# Patient Record
Sex: MALE | Race: WHITE | NOT HISPANIC OR LATINO | Employment: OTHER | URBAN - METROPOLITAN AREA
[De-identification: names, ages, dates, MRNs, and addresses within clinical notes are randomized per-mention and may not be internally consistent; named-entity substitution may affect disease eponyms.]

---

## 2018-11-29 ENCOUNTER — OFFICE VISIT (OUTPATIENT)
Dept: FAMILY MEDICINE | Facility: CLINIC | Age: 68
End: 2018-11-29
Payer: COMMERCIAL

## 2018-11-29 VITALS
HEIGHT: 65 IN | HEART RATE: 81 BPM | SYSTOLIC BLOOD PRESSURE: 120 MMHG | WEIGHT: 195.88 LBS | DIASTOLIC BLOOD PRESSURE: 80 MMHG | OXYGEN SATURATION: 97 % | TEMPERATURE: 98 F | RESPIRATION RATE: 16 BRPM | BODY MASS INDEX: 32.64 KG/M2

## 2018-11-29 DIAGNOSIS — I10 ESSENTIAL HYPERTENSION: ICD-10-CM

## 2018-11-29 DIAGNOSIS — Z00.00 WELLNESS EXAMINATION: Primary | ICD-10-CM

## 2018-11-29 DIAGNOSIS — E78.5 HYPERLIPIDEMIA, UNSPECIFIED HYPERLIPIDEMIA TYPE: ICD-10-CM

## 2018-11-29 PROCEDURE — 99387 INIT PM E/M NEW PAT 65+ YRS: CPT | Mod: ,,, | Performed by: FAMILY MEDICINE

## 2018-11-29 PROCEDURE — 1101F PT FALLS ASSESS-DOCD LE1/YR: CPT | Mod: ,,, | Performed by: FAMILY MEDICINE

## 2018-11-29 RX ORDER — AMLODIPINE AND BENAZEPRIL HYDROCHLORIDE 5; 10 MG/1; MG/1
1 CAPSULE ORAL DAILY
Refills: 3 | COMMUNITY
Start: 2018-10-15 | End: 2019-06-18 | Stop reason: SDUPTHER

## 2018-11-29 RX ORDER — PRAVASTATIN SODIUM 80 MG/1
80 TABLET ORAL DAILY
Refills: 3 | COMMUNITY
Start: 2018-10-15 | End: 2019-06-18 | Stop reason: SDUPTHER

## 2018-11-29 RX ORDER — NAPROXEN 500 MG/1
500 TABLET ORAL 2 TIMES DAILY
Refills: 1 | COMMUNITY
Start: 2018-11-23 | End: 2018-11-29

## 2018-11-29 NOTE — PROGRESS NOTES
SUBJECTIVE:    Patient ID: Jose Morataya is a 68 y.o. male.    Chief Complaint: Annual Exam    HPI  68-year-old male new to this provider no past medical records to review patient currently being treated for hypertension hyperlipidemia blood pressure today's well-controlled. Last lipid panel unknown note is also on file. Patient is doing well today he has no complaints he does not need medication refills    Past Medical History:   Diagnosis Date    Hyperlipidemia     Hypertension      Social History     Socioeconomic History    Marital status:      Spouse name: Not on file    Number of children: Not on file    Years of education: Not on file    Highest education level: Not on file   Social Needs    Financial resource strain: Not on file    Food insecurity - worry: Not on file    Food insecurity - inability: Not on file    Transportation needs - medical: Not on file    Transportation needs - non-medical: Not on file   Occupational History    Not on file   Tobacco Use    Smoking status: Never Smoker    Smokeless tobacco: Never Used   Substance and Sexual Activity    Alcohol use: No     Frequency: Never    Drug use: No    Sexual activity: Not on file   Other Topics Concern    Not on file   Social History Narrative    Not on file     History reviewed. No pertinent surgical history.  Family History   Problem Relation Age of Onset    Cancer Mother     No Known Problems Father      Current Outpatient Medications   Medication Sig Dispense Refill    amlodipine-benazepril 5-10 mg (LOTREL) 5-10 mg per capsule Take 1 capsule by mouth once daily.  3    pravastatin (PRAVACHOL) 80 MG tablet Take 80 mg by mouth once daily.  3     No current facility-administered medications for this visit.      Review of patient's allergies indicates:  No Known Allergies    Review of Systems   Constitutional: Negative for activity change, appetite change, fatigue and fever.   HENT: Negative for congestion, ear pain,  "hearing loss, postnasal drip, sinus pressure, sinus pain, sneezing and sore throat.    Eyes: Negative for photophobia and pain.   Respiratory: Negative for cough, chest tightness, shortness of breath and wheezing.    Cardiovascular: Negative for chest pain and leg swelling.   Gastrointestinal: Negative for abdominal distention, abdominal pain, blood in stool, constipation, diarrhea, nausea and vomiting.   Endocrine: Negative for cold intolerance, heat intolerance, polydipsia and polyuria.   Genitourinary: Negative for difficulty urinating, dysuria, flank pain, frequency, hematuria and urgency.        Patient waking up 3 times a night to urinate, denies difficulty starting stream, no breaks in  His stream, no dysuria no hematuria.   Musculoskeletal: Negative for arthralgias, back pain, joint swelling, myalgias and neck pain.   Skin: Negative for pallor and rash.   Allergic/Immunologic: Negative for environmental allergies and food allergies.   Neurological: Negative for dizziness, weakness, light-headedness and headaches.   Hematological: Does not bruise/bleed easily.   Psychiatric/Behavioral: Negative for confusion, decreased concentration and sleep disturbance. The patient is not nervous/anxious.           Blood pressure 120/80, pulse 81, temperature 98.3 °F (36.8 °C), temperature source Oral, resp. rate 16, height 5' 5" (1.651 m), weight 88.9 kg (195 lb 14.4 oz), SpO2 97 %. Body mass index is 32.6 kg/m².   Objective:      Physical Exam   Constitutional: He appears well-developed and well-nourished. No distress.   HENT:   Head: Normocephalic and atraumatic.   Right Ear: External ear normal.   Left Ear: External ear normal.   Mouth/Throat: Oropharynx is clear and moist.   Eyes: Conjunctivae are normal. Pupils are equal, round, and reactive to light. Right eye exhibits no discharge. Left eye exhibits no discharge.   Neck: Normal range of motion. Neck supple. No thyromegaly present.   Cardiovascular: Normal rate, " regular rhythm and normal heart sounds.   No murmur heard.  Pulmonary/Chest: Effort normal and breath sounds normal. No stridor. No respiratory distress. He has no wheezes.   Lymphadenopathy:     He has no cervical adenopathy.   Skin: Skin is warm and dry. Capillary refill takes less than 2 seconds. No rash noted.           Assessment:       1. Wellness examination    2. Essential hypertension    3. Hyperlipidemia, unspecified hyperlipidemia type         Plan:           Wellness examination  -     PSA, Screening; Future; Expected date: 11/29/2018  Pt with night time increased urination, that has increased since he has gotten older, will screen with PSA before next visit.    Essential hypertension  -     Basic metabolic panel; Future; Expected date: 11/29/2018  Will get labs before next visit.    Hyperlipidemia, unspecified hyperlipidemia type  -     Lipid panel; Future; Expected date: 11/29/2018  Will get labs before next visit.

## 2019-01-15 ENCOUNTER — OFFICE VISIT (OUTPATIENT)
Dept: PODIATRY | Facility: CLINIC | Age: 69
End: 2019-01-15
Payer: COMMERCIAL

## 2019-01-15 VITALS
WEIGHT: 200 LBS | HEART RATE: 77 BPM | SYSTOLIC BLOOD PRESSURE: 132 MMHG | TEMPERATURE: 98 F | BODY MASS INDEX: 33.28 KG/M2 | DIASTOLIC BLOOD PRESSURE: 80 MMHG

## 2019-01-15 DIAGNOSIS — M76.72 PERONEAL TENDONITIS, LEFT: Primary | ICD-10-CM

## 2019-01-15 DIAGNOSIS — M79.672 FOOT PAIN, LEFT: ICD-10-CM

## 2019-01-15 PROCEDURE — 73630 PR  X-RAY FOOT 3+ VW: ICD-10-PCS | Mod: LT,,, | Performed by: PODIATRIST

## 2019-01-15 PROCEDURE — 99213 PR OFFICE/OUTPT VISIT, EST, LEVL III, 20-29 MIN: ICD-10-PCS | Mod: 25,,, | Performed by: PODIATRIST

## 2019-01-15 PROCEDURE — 73610 PR  X-RAY ANKLE 3+ VW: ICD-10-PCS | Mod: LT,,, | Performed by: PODIATRIST

## 2019-01-15 PROCEDURE — 73630 X-RAY EXAM OF FOOT: CPT | Mod: LT,,, | Performed by: PODIATRIST

## 2019-01-15 PROCEDURE — 99213 OFFICE O/P EST LOW 20 MIN: CPT | Mod: 25,,, | Performed by: PODIATRIST

## 2019-01-15 PROCEDURE — 73620 PR  X-RAY FOOT 2 VW: ICD-10-PCS | Mod: LT,,, | Performed by: PODIATRIST

## 2019-01-15 PROCEDURE — 73620 X-RAY EXAM OF FOOT: CPT | Mod: LT,,, | Performed by: PODIATRIST

## 2019-01-15 PROCEDURE — 73610 X-RAY EXAM OF ANKLE: CPT | Mod: LT,,, | Performed by: PODIATRIST

## 2019-01-15 RX ORDER — NAPROXEN 500 MG/1
500 TABLET ORAL 2 TIMES DAILY
Qty: 60 TABLET | Refills: 1 | Status: SHIPPED | OUTPATIENT
Start: 2019-01-15 | End: 2019-02-14

## 2019-01-15 NOTE — PATIENT INSTRUCTIONS
Treating Tendonitis of the Foot  Your healthcare provider's first concern is to reduce your symptoms. Using ice and heat, taking medicines, and limiting activity help control pain and swelling. Follow all of your healthcare provider's instructions. Returning to activity too soon may cause your symptoms to come back.    Ice and heat  Ice helps prevent swelling and reduce pain. Place ice on the painful area for 10 to 15 minutes. Repeat the icing several times a day. If ?you have had the problem for a while, using heat may help. Apply a heating pad or hot towels to the tendon for 20 to 30 minutes 2 or 3 times a day.  Medicines  Your healthcare provider may tell you to take ibuprofen or other anti-inflammatory medicines. These reduce pain and swelling. Take them as directed. Dont wait until you feel pain. In more severe cases, cortisone may be injected to relieve pain.  Limiting activities  Rest allows the tissues in your foot to heal. Stay off your feet for a few days, then slowly work back into activity. If you do high-impact activities, such as running or aerobics, try other activities that place less strain on your foot. Cycling and swimming are good choices.  Date Last Reviewed: 9/21/2015  © 5282-6278 [employer] [address] [place] [address], [place] [address]. All rights reserved. This information is not intended as a substitute for professional medical care. Always follow your healthcare professional's instructions.

## 2019-01-15 NOTE — PROGRESS NOTES
[address] [facility] [address]  [place] [address]  Phone: [phone]   [phone]    Patient's PCP:Simon Hall MD  Referring Provider: No ref. provider found    Subjective:      Chief Complaint:: Foot Pain (left foot side of foot)    OTONIEL Morataya is a 68 y.o. male who presents with a complaint of  Left foot pain and up the ankle lasting for 3 weeks. Onset of the symptoms was random.  Current symptoms include sharp pain.  Aggravating factors are when shoes are on. Symptoms have stayed the time. Treatment to date have included icing. Patients rates pain 8/10 on pain scale.    Vitals:    01/15/19 1543   Temp: 97.7 °F (36.5 °C)     Shoe Size: 7    Past Surgical History:   Procedure Laterality Date    TONSILLECTOMY       Past Medical History:   Diagnosis Date    Hyperlipidemia     Hypertension      Family History   Problem Relation Age of Onset    Cancer Mother     No Known Problems Father         Social History:   Marital Status:   Alcohol History:  reports that he does not drink alcohol.  Tobacco History:  reports that  has never smoked. he has never used smokeless tobacco.  Drug History:  reports that he does not use drugs.    Review of patient's allergies indicates:   Allergen Reactions    Amoxicillin        Current Outpatient Medications   Medication Sig Dispense Refill    amlodipine-benazepril 5-10 mg (LOTREL) 5-10 mg per capsule Take 1 capsule by mouth once daily.  3    pravastatin (PRAVACHOL) 80 MG tablet Take 80 mg by mouth once daily.  3     No current facility-administered medications for this visit.        Review of Systems   Constitutional: Negative for chills, fatigue, fever and unexpected weight change.   HENT: Negative for hearing loss and trouble swallowing.    Eyes: Negative for photophobia and visual disturbance.   Respiratory: Negative for cough, shortness of breath and wheezing.    Cardiovascular: Negative for chest pain, palpitations and leg swelling.    Gastrointestinal: Negative for abdominal pain and nausea.   Genitourinary: Negative for dysuria and frequency.   Musculoskeletal: Negative for arthralgias, back pain and joint swelling.   Skin: Negative for rash.   Neurological: Negative for tremors, seizures, weakness, numbness and headaches.   Hematological: Does not bruise/bleed easily.         Objective:        Physical Exam:   Vascular;  DP 2/4 bilateral, PT 2/4 bilateral, CFT 2 toes 2 seconds bilateral, no varicose veins, no peripheral edema.  Neurological:  Sensation grossly intact, muscle strength 5 over 5 for all muscles and tendons attaching to the foot and ankle.  Negative Tinel's sign by sural nerve lateral left ankle. Negative Tinel sign tarsal tunnel bilateral.  Dermatological:  No ecchymosis seen on lateral surface of left ankle along painful site of peroneal tendons. No erythema no soft tissue masses felt. There are no open wounds or lesions on either of the feet or ankle or lower leg bilateral.  Musculoskeletal: Bilateral flexible moderate pes cavus deformity. Weightbearing with rear foot in neutral. Full range of motion of the ankle subtalar joint Lisfranc's joint and MPJs without crepitus. Pain on palpation of the peroneus brevis tendon from just inferior to the fibula to its attachment  at the fifth metatarsal base.  There is minimal edema along the course of the peroneus brevis tendon.The tendon has full strength and the patient is able to stevenson against resistance with minimal pain.  There is mild pain along the course of the peroneus brevis tendon with plantarflexion and inversion of the foot.  There is no crepituswith range of motion in the peroneus brevis and longus tendon. There is no subluxating of the paranasal brevis and longus tendon with range of motion the ankle especially in circumduction.    Imaging: weightbearing x-rays AP lateral and lateral oblique of the leftankle, AP and lateral x-rays of the left foot  No fractures are seen  in the area of symptoms in the calcaneus cuboid fifth metatarsal base. Pes cavus foot structure. No bone tumors or soft tissue lesions seen on the x-ray.       Assessment and Plan:     assessment:  #1 peroneal brevis tendinitis left ankle and foot.  #2 pain left foot and ankle.  #3 pes cavus with mild lateral ankle instability.      Plan:  #1 patient will use an Ace wrap and is declined using an ankle brace. He will wrap the foot and ankle every day wear jogging shoes,ice the area for 15 inutes every day, no barefoot, no impact exercising, Naprosyn 5 mg by mouth twice a day with food, dispense 60 pills one refill. if pain is still present in 4 weeks he is return to see me.  t that time may consider Cam Walker boot cast.And possible MRI of the left ankle rule out partial tear of tendon.    Counseling:   I provided patient education verbally regarding:   Patient diagnosis, treatment options, as well as alternatives, risks, and benefits.     Treatment of tendonitis with rest, ice, oral NSAID, topical antiinflammatory creams, cam walker boot if needed, and MRI if needed.    I discussed conservative treatment of minor tendon tear with cast for 6 to 8 weeks, MRI, ultrasound if needed for evaluation of the rupture and possible need for surgical repair.    This note was created using Dragon voice recognition software that occasionally misinterpreted phrases or words.

## 2019-03-11 LAB
BUN SERPL-MCNC: 18 MG/DL (ref 8–20)
CALCIUM SERPL-MCNC: 8.8 MG/DL (ref 7.7–10.4)
CHLORIDE: 105 MMOL/L (ref 98–110)
CO2 SERPL-SCNC: 29.5 MMOL/L (ref 22.8–31.6)
COMPLEXED PSA SERPL-MCNC: 4.6 NG/ML (ref 0–3)
CREATININE: 0.79 MG/DL (ref 0.6–1.4)
GLUCOSE: 118 MG/DL (ref 70–99)
POTASSIUM SERPL-SCNC: 4.2 MMOL/L (ref 3.5–5)
SODIUM: 142 MMOL/L (ref 134–144)

## 2019-03-19 ENCOUNTER — TELEPHONE (OUTPATIENT)
Dept: FAMILY MEDICINE | Facility: CLINIC | Age: 69
End: 2019-03-19

## 2019-03-19 DIAGNOSIS — R97.20 ELEVATED PSA, LESS THAN 10 NG/ML: Primary | ICD-10-CM

## 2019-03-19 NOTE — PROGRESS NOTES
Please call the patient with results if they do not have portal access. Tell patient that his Cholesterol looks OK, cut back on fired foods and fatty foods, His Blood sugar is in the pre-diabetes range to improve this he should decrease his carbohydrates (bread, rice, potatoes, pasta) and increase his activity (walking 30 min a day ).

## 2019-03-19 NOTE — TELEPHONE ENCOUNTER
Please call patient tell him the his PSA, the lab test looking at his prostate is slightly elevated, I would like him to check it again before his visit on 07 Jun 2019, I will place the order in and he can do it about a week before the appointment.

## 2019-03-19 NOTE — TELEPHONE ENCOUNTER
----- Message from Simon Hall MD sent at 3/19/2019  7:40 AM CDT -----  Please call the patient with results if they do not have portal access. Tell patient that his Cholesterol looks OK, cut back on fired foods and fatty foods, His Blood sugar is in the pre-diabetes range to improve this he should decrease his carbohydrates (bread, rice, potatoes, pasta) and increase his activity (walking 30 min a day ).

## 2019-03-29 NOTE — TELEPHONE ENCOUNTER
LMOM to call with results. I have called two other times but did not know I could leave a not like this one. I will try again later.

## 2019-04-08 LAB — COMPLEXED PSA SERPL-MCNC: 3.6 NG/ML (ref 0–3)

## 2019-04-11 ENCOUNTER — TELEPHONE (OUTPATIENT)
Dept: FAMILY MEDICINE | Facility: CLINIC | Age: 69
End: 2019-04-11

## 2019-04-11 DIAGNOSIS — R97.20 ELEVATED PSA: Primary | ICD-10-CM

## 2019-04-11 DIAGNOSIS — R97.20 ELEVATED PSA, LESS THAN 10 NG/ML: Primary | ICD-10-CM

## 2019-04-11 NOTE — PROGRESS NOTES
Please call the patient with results if they do not have portal access. Your PSA has decreased, we should check it again before your next appointment this summerY

## 2019-04-11 NOTE — TELEPHONE ENCOUNTER
----- Message from Simon Hall MD sent at 4/11/2019  7:37 AM CDT -----  Please call the patient with results if they do not have portal access. Your PSA has decreased, we should check it again before your next appointment this summerY

## 2019-06-18 DIAGNOSIS — E78.5 HYPERLIPIDEMIA, UNSPECIFIED HYPERLIPIDEMIA TYPE: ICD-10-CM

## 2019-06-18 DIAGNOSIS — I10 ESSENTIAL HYPERTENSION: Primary | ICD-10-CM

## 2019-06-19 RX ORDER — AMLODIPINE AND BENAZEPRIL HYDROCHLORIDE 5; 10 MG/1; MG/1
1 CAPSULE ORAL DAILY
Qty: 30 CAPSULE | Refills: 3 | Status: SHIPPED | OUTPATIENT
Start: 2019-06-19 | End: 2019-07-31 | Stop reason: SDUPTHER

## 2019-06-19 RX ORDER — PRAVASTATIN SODIUM 80 MG/1
80 TABLET ORAL DAILY
Qty: 30 TABLET | Refills: 3 | Status: SHIPPED | OUTPATIENT
Start: 2019-06-19 | End: 2019-07-31 | Stop reason: SDUPTHER

## 2019-07-03 LAB — COMPLEXED PSA SERPL-MCNC: 3.64 NG/ML (ref 0–3)

## 2019-07-31 ENCOUNTER — OFFICE VISIT (OUTPATIENT)
Dept: FAMILY MEDICINE | Facility: CLINIC | Age: 69
End: 2019-07-31
Payer: COMMERCIAL

## 2019-07-31 VITALS
WEIGHT: 192.38 LBS | HEART RATE: 88 BPM | TEMPERATURE: 99 F | RESPIRATION RATE: 18 BRPM | SYSTOLIC BLOOD PRESSURE: 130 MMHG | DIASTOLIC BLOOD PRESSURE: 80 MMHG | HEIGHT: 65 IN | OXYGEN SATURATION: 96 % | BODY MASS INDEX: 32.05 KG/M2

## 2019-07-31 DIAGNOSIS — Z11.59 ENCOUNTER FOR HEPATITIS C SCREENING TEST FOR LOW RISK PATIENT: ICD-10-CM

## 2019-07-31 DIAGNOSIS — R97.20 ELEVATED PSA: ICD-10-CM

## 2019-07-31 DIAGNOSIS — E78.5 HYPERLIPIDEMIA, UNSPECIFIED HYPERLIPIDEMIA TYPE: ICD-10-CM

## 2019-07-31 DIAGNOSIS — I10 ESSENTIAL HYPERTENSION: Primary | ICD-10-CM

## 2019-07-31 DIAGNOSIS — R73.03 PREDIABETES: ICD-10-CM

## 2019-07-31 PROCEDURE — 99999 PR PBB SHADOW E&M-EST. PATIENT-LVL IV: ICD-10-PCS | Mod: PBBFAC,,, | Performed by: FAMILY MEDICINE

## 2019-07-31 PROCEDURE — 99214 OFFICE O/P EST MOD 30 MIN: CPT | Mod: S$GLB,,, | Performed by: FAMILY MEDICINE

## 2019-07-31 PROCEDURE — 3075F SYST BP GE 130 - 139MM HG: CPT | Mod: S$GLB,,, | Performed by: FAMILY MEDICINE

## 2019-07-31 PROCEDURE — 3075F PR MOST RECENT SYSTOLIC BLOOD PRESS GE 130-139MM HG: ICD-10-PCS | Mod: S$GLB,,, | Performed by: FAMILY MEDICINE

## 2019-07-31 PROCEDURE — 99214 PR OFFICE/OUTPT VISIT, EST, LEVL IV, 30-39 MIN: ICD-10-PCS | Mod: S$GLB,,, | Performed by: FAMILY MEDICINE

## 2019-07-31 PROCEDURE — 3079F DIAST BP 80-89 MM HG: CPT | Mod: S$GLB,,, | Performed by: FAMILY MEDICINE

## 2019-07-31 PROCEDURE — 1101F PT FALLS ASSESS-DOCD LE1/YR: CPT | Mod: S$GLB,,, | Performed by: FAMILY MEDICINE

## 2019-07-31 PROCEDURE — 99999 PR PBB SHADOW E&M-EST. PATIENT-LVL IV: CPT | Mod: PBBFAC,,, | Performed by: FAMILY MEDICINE

## 2019-07-31 PROCEDURE — 1101F PR PT FALLS ASSESS DOC 0-1 FALLS W/OUT INJ PAST YR: ICD-10-PCS | Mod: S$GLB,,, | Performed by: FAMILY MEDICINE

## 2019-07-31 PROCEDURE — 3079F PR MOST RECENT DIASTOLIC BLOOD PRESSURE 80-89 MM HG: ICD-10-PCS | Mod: S$GLB,,, | Performed by: FAMILY MEDICINE

## 2019-07-31 RX ORDER — PRAVASTATIN SODIUM 80 MG/1
80 TABLET ORAL DAILY
Qty: 30 TABLET | Refills: 11 | Status: SHIPPED | OUTPATIENT
Start: 2019-07-31 | End: 2020-08-06 | Stop reason: SDUPTHER

## 2019-07-31 RX ORDER — AMLODIPINE AND BENAZEPRIL HYDROCHLORIDE 5; 10 MG/1; MG/1
1 CAPSULE ORAL DAILY
Qty: 30 CAPSULE | Refills: 11 | Status: SHIPPED | OUTPATIENT
Start: 2019-07-31 | End: 2020-08-06 | Stop reason: SDUPTHER

## 2019-07-31 NOTE — PROGRESS NOTES
SUBJECTIVE:    Patient ID: Jose Morataya is a 69 y.o. male.    Chief Complaint: Hypertension  70 yo male returns to clinic to follow up on his chronic medical issues, his bp appears well controlled today, his lipids (see below) pt is on a moderate intensity statin without any complaints. His fasting blood glucose demonstrates glucose in the prediabetes range.  His initial PSA was elevated, it has been repeated twice and appears to be stable over the past 4 months and is currently within the age adjusted normal range. The age adjusted PSA range 70 to 79 years - 0 to 6.5 ng/mL       Hypertension   This is a chronic problem. The current episode started more than 1 year ago. The problem is unchanged. The problem is controlled. Pertinent negatives include no anxiety, blurred vision, chest pain, headaches, malaise/fatigue, neck pain, orthopnea, palpitations, peripheral edema, PND, shortness of breath or sweats. There are no associated agents to hypertension. Risk factors for coronary artery disease include dyslipidemia, male gender, obesity and sedentary lifestyle. Past treatments include calcium channel blockers and angiotensin blockers. The current treatment provides moderate improvement. Compliance problems include diet and exercise.  There is no history of kidney disease, CAD/MI, CVA, heart failure, left ventricular hypertrophy or retinopathy. There is no history of chronic renal disease.   Hyperlipidemia   This is a chronic problem. The current episode started more than 1 year ago. The problem is controlled. Recent lipid tests were reviewed and are normal. Exacerbating diseases include obesity. He has no history of chronic renal disease, diabetes, hypothyroidism, liver disease or nephrotic syndrome. There are no known factors aggravating his hyperlipidemia. Pertinent negatives include no chest pain, myalgias or shortness of breath. Current antihyperlipidemic treatment includes statins. Compliance problems include  adherence to exercise and adherence to diet.  Risk factors for coronary artery disease include dyslipidemia, male sex, obesity, a sedentary lifestyle and hypertension.     Cholesterol                   141                         mg/dL       Triglycerides                  79                         mg/dL       HDL Cholesterol                37                        23-75  mg/dL       LDL Cholesterol                88                        0-100  mg/dL       VLDL Cholesterol               16                        12-27  mg/dL       Cholesterol Ratio            4.00                                             Fasting   Glucose 70 - 99 mg/dL 118High       PSA  PSA, SCREEN 0.0 - 3.0 ng/mL 4.6High       PSA, SCREEN 0.0 - 3.0 ng/mL 3.6High       PSA, SCREEN 0.00 - 3.00 ng/mL 3.64High              Past Medical History:   Diagnosis Date    Hyperlipidemia     Hypertension      Social History     Socioeconomic History    Marital status:      Spouse name: Not on file    Number of children: Not on file    Years of education: Not on file    Highest education level: Not on file   Occupational History    Not on file   Social Needs    Financial resource strain: Not on file    Food insecurity:     Worry: Not on file     Inability: Not on file    Transportation needs:     Medical: Not on file     Non-medical: Not on file   Tobacco Use    Smoking status: Never Smoker    Smokeless tobacco: Never Used   Substance and Sexual Activity    Alcohol use: No     Frequency: Never    Drug use: No    Sexual activity: Not on file   Lifestyle    Physical activity:     Days per week: Not on file     Minutes per session: Not on file    Stress: Only a little   Relationships    Social connections:     Talks on phone: Not on file     Gets together: Not on file     Attends Denominational service: Not on file     Active member of club or organization: Not on file     Attends meetings of clubs or organizations: Not on  file     Relationship status: Not on file   Other Topics Concern    Not on file   Social History Narrative    Not on file     Past Surgical History:   Procedure Laterality Date    TONSILLECTOMY       Family History   Problem Relation Age of Onset    Cancer Mother     No Known Problems Father      Current Outpatient Medications   Medication Sig Dispense Refill    amlodipine-benazepril 5-10 mg (LOTREL) 5-10 mg per capsule Take 1 capsule by mouth once daily. 30 capsule 11    pravastatin (PRAVACHOL) 80 MG tablet Take 1 tablet (80 mg total) by mouth once daily. 30 tablet 11     No current facility-administered medications for this visit.      Review of patient's allergies indicates:   Allergen Reactions    Amoxicillin        Review of Systems   Constitutional: Negative for activity change, appetite change, fatigue, fever and malaise/fatigue.   HENT: Negative for congestion, ear pain, hearing loss, postnasal drip, sinus pressure, sinus pain, sneezing and sore throat.    Eyes: Negative for blurred vision, photophobia and pain.   Respiratory: Negative for cough, chest tightness, shortness of breath and wheezing.    Cardiovascular: Negative for chest pain, palpitations, orthopnea, leg swelling and PND.   Gastrointestinal: Negative for abdominal distention, abdominal pain, blood in stool, constipation, diarrhea, nausea and vomiting.   Endocrine: Negative for cold intolerance, heat intolerance, polydipsia and polyuria.   Genitourinary: Negative for difficulty urinating, dysuria, flank pain, frequency, hematuria and urgency.   Musculoskeletal: Negative for arthralgias, back pain, joint swelling, myalgias and neck pain.   Skin: Negative for pallor and rash.   Allergic/Immunologic: Negative for environmental allergies and food allergies.   Neurological: Negative for dizziness, weakness, light-headedness and headaches.   Hematological: Does not bruise/bleed easily.   Psychiatric/Behavioral: Negative for confusion,  "decreased concentration and sleep disturbance. The patient is not nervous/anxious.           Blood pressure 130/80, pulse 88, temperature 98.6 °F (37 °C), temperature source Oral, resp. rate 18, height 5' 5" (1.651 m), weight 87.3 kg (192 lb 6.4 oz), SpO2 96 %. Body mass index is 32.02 kg/m².   Objective:      Physical Exam   Constitutional: He is oriented to person, place, and time. He appears well-developed and well-nourished. No distress.   HENT:   Head: Normocephalic and atraumatic.   Right Ear: External ear normal.   Left Ear: External ear normal.   Mouth/Throat: Oropharynx is clear and moist.   Eyes: Pupils are equal, round, and reactive to light. Conjunctivae and EOM are normal. Right eye exhibits no discharge. Left eye exhibits no discharge.   Cardiovascular: Normal rate, regular rhythm and normal heart sounds.   No murmur heard.  Pulmonary/Chest: Effort normal and breath sounds normal. No respiratory distress. He has no wheezes.   Neurological: He is alert and oriented to person, place, and time.   Skin: Skin is warm and dry. He is not diaphoretic.   Vitals reviewed.          Assessment:       1. Essential hypertension    2. Hyperlipidemia, unspecified hyperlipidemia type    3. Elevated PSA    4. Encounter for hepatitis C screening test for low risk patient         Plan:           Essential hypertension  -     amlodipine-benazepril 5-10 mg (LOTREL) 5-10 mg per capsule; Take 1 capsule by mouth once daily.  Dispense: 30 capsule; Refill: 11  Bp doing well will continue on his current dose of medications, discussed decreasing the sodium in his diet.    Hyperlipidemia, unspecified hyperlipidemia type  -     pravastatin (PRAVACHOL) 80 MG tablet; Take 1 tablet (80 mg total) by mouth once daily.  Dispense: 30 tablet; Refill: 11  Will continue on his current statin, pt has no known hx of heart disease.    Elevated PSA  -     PSA, Screening; Future; Expected date: 07/31/2019  Pt with elevated PSA but within his age " adjusted normal range, will repeat his PSA in 6 months .    Encounter for hepatitis C screening test for low risk patient  -     Hepatitis C antibody; Future; Expected date: 07/31/2019      Prediabetes  Discussed his elevated fasting blood glucose, and the need to decrease the carbohydrates in his diet and increase his physical activity.

## 2020-01-23 ENCOUNTER — TELEPHONE (OUTPATIENT)
Dept: FAMILY MEDICINE | Facility: CLINIC | Age: 70
End: 2020-01-23

## 2020-01-23 NOTE — TELEPHONE ENCOUNTER
Left message to make patient aware of the pending labs. Stated that he needs to get these completed before his next appointment.

## 2020-02-12 ENCOUNTER — OFFICE VISIT (OUTPATIENT)
Dept: FAMILY MEDICINE | Facility: CLINIC | Age: 70
End: 2020-02-12
Payer: COMMERCIAL

## 2020-02-12 VITALS
BODY MASS INDEX: 32.49 KG/M2 | HEIGHT: 65 IN | TEMPERATURE: 98 F | DIASTOLIC BLOOD PRESSURE: 82 MMHG | HEART RATE: 83 BPM | SYSTOLIC BLOOD PRESSURE: 120 MMHG | RESPIRATION RATE: 18 BRPM | OXYGEN SATURATION: 95 % | WEIGHT: 195 LBS

## 2020-02-12 DIAGNOSIS — Z11.59 ENCOUNTER FOR HEPATITIS C SCREENING TEST FOR LOW RISK PATIENT: ICD-10-CM

## 2020-02-12 DIAGNOSIS — R97.20 ELEVATED PSA: ICD-10-CM

## 2020-02-12 DIAGNOSIS — E78.49 OTHER HYPERLIPIDEMIA: ICD-10-CM

## 2020-02-12 DIAGNOSIS — I10 ESSENTIAL HYPERTENSION: Primary | ICD-10-CM

## 2020-02-12 PROCEDURE — 3074F PR MOST RECENT SYSTOLIC BLOOD PRESSURE < 130 MM HG: ICD-10-PCS | Mod: S$GLB,,, | Performed by: FAMILY MEDICINE

## 2020-02-12 PROCEDURE — 3074F SYST BP LT 130 MM HG: CPT | Mod: S$GLB,,, | Performed by: FAMILY MEDICINE

## 2020-02-12 PROCEDURE — 1159F PR MEDICATION LIST DOCUMENTED IN MEDICAL RECORD: ICD-10-PCS | Mod: S$GLB,,, | Performed by: FAMILY MEDICINE

## 2020-02-12 PROCEDURE — 1126F AMNT PAIN NOTED NONE PRSNT: CPT | Mod: S$GLB,,, | Performed by: FAMILY MEDICINE

## 2020-02-12 PROCEDURE — 1159F MED LIST DOCD IN RCRD: CPT | Mod: S$GLB,,, | Performed by: FAMILY MEDICINE

## 2020-02-12 PROCEDURE — 99214 PR OFFICE/OUTPT VISIT, EST, LEVL IV, 30-39 MIN: ICD-10-PCS | Mod: S$GLB,,, | Performed by: FAMILY MEDICINE

## 2020-02-12 PROCEDURE — 3079F PR MOST RECENT DIASTOLIC BLOOD PRESSURE 80-89 MM HG: ICD-10-PCS | Mod: S$GLB,,, | Performed by: FAMILY MEDICINE

## 2020-02-12 PROCEDURE — 1170F PR FUNCTIONAL STATUS ASSESSED: ICD-10-PCS | Mod: S$GLB,,, | Performed by: FAMILY MEDICINE

## 2020-02-12 PROCEDURE — 1126F PR PAIN SEVERITY QUANTIFIED, NO PAIN PRESENT: ICD-10-PCS | Mod: S$GLB,,, | Performed by: FAMILY MEDICINE

## 2020-02-12 PROCEDURE — 1101F PR PT FALLS ASSESS DOC 0-1 FALLS W/OUT INJ PAST YR: ICD-10-PCS | Mod: S$GLB,,, | Performed by: FAMILY MEDICINE

## 2020-02-12 PROCEDURE — 99214 OFFICE O/P EST MOD 30 MIN: CPT | Mod: S$GLB,,, | Performed by: FAMILY MEDICINE

## 2020-02-12 PROCEDURE — 3079F DIAST BP 80-89 MM HG: CPT | Mod: S$GLB,,, | Performed by: FAMILY MEDICINE

## 2020-02-12 PROCEDURE — 1101F PT FALLS ASSESS-DOCD LE1/YR: CPT | Mod: S$GLB,,, | Performed by: FAMILY MEDICINE

## 2020-02-12 PROCEDURE — 1170F FXNL STATUS ASSESSED: CPT | Mod: S$GLB,,, | Performed by: FAMILY MEDICINE

## 2020-02-12 NOTE — PROGRESS NOTES
SUBJECTIVE:    Patient ID: Jose Morataya is a 69 y.o. male.    Chief Complaint: Hyperlipidemia and Hypertension  70 yo male here today to follow up on his chronic HTN, Hyperlipidemia, and elevated PSA.  Pt did not go to the lab before this visit, he is anant any CP, no Head aches, No SOB, No difficulties urinating.      SPMHx:  HTN: Amlodipine/Benazapril 5/10mg his blood pressure is well controlled  Hyperlipidemia: Pravastatin 80mg  Last lipid March.  Elevated PSA: Last  PSA 3.64 needs to be repeated.      HPI      Past Medical History:   Diagnosis Date    Hyperlipidemia     Hypertension      Social History     Socioeconomic History    Marital status:      Spouse name: Not on file    Number of children: Not on file    Years of education: Not on file    Highest education level: Not on file   Occupational History    Not on file   Social Needs    Financial resource strain: Not on file    Food insecurity:     Worry: Not on file     Inability: Not on file    Transportation needs:     Medical: Not on file     Non-medical: Not on file   Tobacco Use    Smoking status: Never Smoker    Smokeless tobacco: Never Used   Substance and Sexual Activity    Alcohol use: No     Frequency: Never    Drug use: No    Sexual activity: Not on file   Lifestyle    Physical activity:     Days per week: Not on file     Minutes per session: Not on file    Stress: Only a little   Relationships    Social connections:     Talks on phone: Not on file     Gets together: Not on file     Attends Baptism service: Not on file     Active member of club or organization: Not on file     Attends meetings of clubs or organizations: Not on file     Relationship status: Not on file   Other Topics Concern    Not on file   Social History Narrative    Not on file     Past Surgical History:   Procedure Laterality Date    TONSILLECTOMY       Family History   Problem Relation Age of Onset    Cancer Mother     No Known Problems  "Father      Current Outpatient Medications   Medication Sig Dispense Refill    amlodipine-benazepril 5-10 mg (LOTREL) 5-10 mg per capsule Take 1 capsule by mouth once daily. 30 capsule 11    pravastatin (PRAVACHOL) 80 MG tablet Take 1 tablet (80 mg total) by mouth once daily. 30 tablet 11     No current facility-administered medications for this visit.      Review of patient's allergies indicates:   Allergen Reactions    Amoxicillin        Review of Systems   Constitutional: Negative for activity change, appetite change, chills, diaphoresis, fatigue and fever.   HENT: Negative for congestion, rhinorrhea, sinus pressure, sinus pain, sneezing and sore throat.    Eyes: Negative.    Respiratory: Negative for apnea, cough, chest tightness, shortness of breath and wheezing.    Cardiovascular: Negative for chest pain, palpitations and leg swelling.   Genitourinary: Negative for difficulty urinating, dysuria, flank pain, frequency, hematuria and urgency.          Blood pressure 120/82, pulse 83, temperature 98.1 °F (36.7 °C), temperature source Oral, resp. rate 18, height 5' 5" (1.651 m), weight 88.5 kg (195 lb), SpO2 95 %. Body mass index is 32.45 kg/m².   Objective:      Physical Exam   Constitutional: He is oriented to person, place, and time. He appears well-developed and well-nourished. No distress.   HENT:   Head: Normocephalic and atraumatic.   Right Ear: External ear normal.   Left Ear: External ear normal.   Nose: Nose normal.   Mouth/Throat: Oropharynx is clear and moist.   Eyes: Pupils are equal, round, and reactive to light. Conjunctivae and EOM are normal. No scleral icterus.   Cardiovascular: Normal rate, regular rhythm and normal heart sounds.   No murmur heard.  Pulmonary/Chest: Effort normal and breath sounds normal. No respiratory distress. He has no wheezes.   Neurological: He is alert and oriented to person, place, and time.   Skin: Skin is warm and dry. Capillary refill takes less than 2 seconds. No " rash noted. He is not diaphoretic.           Assessment:       1. Essential hypertension    2. Other hyperlipidemia    3. Elevated PSA    4. Encounter for hepatitis C screening test for low risk patient         Plan:           Essential hypertension  Reduce the amount of salt in your diet; Lose weight; Avoid drinking too much alcohol; Exercise at least 30 minutes per day most days of the week.  Continue current medications and home BP monitoring.  Other hyperlipidemia  -     Lipid panel; Future; Expected date: 02/12/2020  Limit red meat, butter, fried foods, cheese, and other foods that have a lot of saturated fat. Consume more: lean meats, fish, fruits, vegetables, whole grains, beans, lentils, and nuts.  Weight loss, and 30-45 min of cardiovascular exercise daily.  Elevated PSA  -     PSA, Screening; Future; Expected date: 02/12/2020  Pt is not having any urinary symptoms or complaints will continue to follow his PSA.    Encounter for hepatitis C screening test for low risk patient  -     Hepatitis C antibody; Future; Expected date: 02/12/2020

## 2020-02-25 ENCOUNTER — LAB VISIT (OUTPATIENT)
Dept: LAB | Facility: HOSPITAL | Age: 70
End: 2020-02-25
Attending: FAMILY MEDICINE
Payer: COMMERCIAL

## 2020-02-25 DIAGNOSIS — E78.49 OTHER HYPERLIPIDEMIA: ICD-10-CM

## 2020-02-25 DIAGNOSIS — R97.20 ELEVATED PSA: ICD-10-CM

## 2020-02-25 DIAGNOSIS — Z11.59 ENCOUNTER FOR HEPATITIS C SCREENING TEST FOR LOW RISK PATIENT: ICD-10-CM

## 2020-02-25 LAB
CHOLEST SERPL-MCNC: 136 MG/DL (ref 120–199)
CHOLEST/HDLC SERPL: 3.8 {RATIO} (ref 2–5)
COMPLEXED PSA SERPL-MCNC: 3.4 NG/ML (ref 0–4)
HDLC SERPL-MCNC: 36 MG/DL (ref 40–75)
HDLC SERPL: 26.5 % (ref 20–50)
LDLC SERPL CALC-MCNC: 90.2 MG/DL (ref 63–159)
NONHDLC SERPL-MCNC: 100 MG/DL
TRIGL SERPL-MCNC: 49 MG/DL (ref 30–150)

## 2020-02-25 PROCEDURE — 84153 ASSAY OF PSA TOTAL: CPT

## 2020-02-25 PROCEDURE — 80061 LIPID PANEL: CPT

## 2020-02-25 PROCEDURE — 36415 COLL VENOUS BLD VENIPUNCTURE: CPT

## 2020-02-25 PROCEDURE — 86803 HEPATITIS C AB TEST: CPT

## 2020-02-27 LAB — HCV AB S/CO SERPL IA: <0.1 S/CO RATIO (ref 0–0.9)

## 2020-08-06 DIAGNOSIS — I10 ESSENTIAL HYPERTENSION: ICD-10-CM

## 2020-08-06 DIAGNOSIS — E78.5 HYPERLIPIDEMIA, UNSPECIFIED HYPERLIPIDEMIA TYPE: ICD-10-CM

## 2020-08-07 ENCOUNTER — LAB VISIT (OUTPATIENT)
Dept: LAB | Facility: HOSPITAL | Age: 70
End: 2020-08-07
Attending: FAMILY MEDICINE
Payer: MEDICARE

## 2020-08-07 DIAGNOSIS — R97.20 ELEVATED PSA: ICD-10-CM

## 2020-08-07 DIAGNOSIS — Z11.59 ENCOUNTER FOR HEPATITIS C SCREENING TEST FOR LOW RISK PATIENT: ICD-10-CM

## 2020-08-07 LAB — COMPLEXED PSA SERPL-MCNC: 4.1 NG/ML (ref 0–4)

## 2020-08-07 PROCEDURE — 36415 COLL VENOUS BLD VENIPUNCTURE: CPT

## 2020-08-07 PROCEDURE — 86803 HEPATITIS C AB TEST: CPT

## 2020-08-07 PROCEDURE — 84153 ASSAY OF PSA TOTAL: CPT | Mod: GZ

## 2020-08-07 RX ORDER — AMLODIPINE AND BENAZEPRIL HYDROCHLORIDE 5; 10 MG/1; MG/1
1 CAPSULE ORAL DAILY
Qty: 30 CAPSULE | Refills: 11 | Status: SHIPPED | OUTPATIENT
Start: 2020-08-07 | End: 2021-08-09 | Stop reason: SDUPTHER

## 2020-08-07 RX ORDER — PRAVASTATIN SODIUM 80 MG/1
80 TABLET ORAL DAILY
Qty: 30 TABLET | Refills: 11 | Status: SHIPPED | OUTPATIENT
Start: 2020-08-07 | End: 2021-08-09

## 2020-08-08 LAB — HCV AB S/CO SERPL IA: <0.1 S/CO RATIO (ref 0–0.9)

## 2020-08-11 ENCOUNTER — TELEPHONE (OUTPATIENT)
Dept: FAMILY MEDICINE | Facility: CLINIC | Age: 70
End: 2020-08-11

## 2020-08-11 NOTE — TELEPHONE ENCOUNTER
Left message for patient to confirm appointment. Stated I the message to return phone call to the office to either confirm that he is coming or to reschedule if he cannot.

## 2020-08-12 ENCOUNTER — OFFICE VISIT (OUTPATIENT)
Dept: FAMILY MEDICINE | Facility: CLINIC | Age: 70
End: 2020-08-12
Payer: MEDICARE

## 2020-08-12 VITALS
HEIGHT: 65 IN | TEMPERATURE: 99 F | BODY MASS INDEX: 33.27 KG/M2 | RESPIRATION RATE: 18 BRPM | DIASTOLIC BLOOD PRESSURE: 80 MMHG | WEIGHT: 199.69 LBS | HEART RATE: 90 BPM | SYSTOLIC BLOOD PRESSURE: 122 MMHG | OXYGEN SATURATION: 98 %

## 2020-08-12 DIAGNOSIS — I10 ESSENTIAL HYPERTENSION: Primary | ICD-10-CM

## 2020-08-12 DIAGNOSIS — E78.49 OTHER HYPERLIPIDEMIA: ICD-10-CM

## 2020-08-12 DIAGNOSIS — R97.20 ELEVATED PSA: ICD-10-CM

## 2020-08-12 PROCEDURE — 99213 OFFICE O/P EST LOW 20 MIN: CPT | Mod: S$GLB,,, | Performed by: FAMILY MEDICINE

## 2020-08-12 PROCEDURE — 99214 OFFICE O/P EST MOD 30 MIN: CPT | Performed by: FAMILY MEDICINE

## 2020-08-12 PROCEDURE — 99213 PR OFFICE/OUTPT VISIT, EST, LEVL III, 20-29 MIN: ICD-10-PCS | Mod: S$GLB,,, | Performed by: FAMILY MEDICINE

## 2020-08-12 NOTE — PROGRESS NOTES
SUBJECTIVE:    Patient ID: Jose Morataya is a 70 y.o. male.    Chief Complaint: Hyperlipidemia and Hypertension  68 yo male here today to follow up on his chronic HTN,  and elevated PSA.  Pt went to the lab before this visit his PSA has remained stable., he is anant any CP, no Head aches, No SOB, No difficulties urinating.        SPMHx:  HTN: Amlodipine/Benazapril 5/10mg his blood pressure is well controlled  Hyperlipidemia: Pravastatin 80mg  Last lipid March.  Elevated PSA: Last  PSA 3.64 needs to be repeated.    PSA, SCREEN 0.00 - 4.00 ng/mL 4.1High   3.4  3.64High  R, CM  3.6High  R, CM  4.6High          Hypertension  This is a chronic problem. The current episode started more than 1 year ago. The problem is unchanged. The problem is controlled. Pertinent negatives include no anxiety, blurred vision, chest pain, headaches, malaise/fatigue, neck pain, orthopnea, palpitations, peripheral edema, PND, shortness of breath or sweats. There are no associated agents to hypertension. Risk factors for coronary artery disease include dyslipidemia, male gender, obesity and sedentary lifestyle. Past treatments include calcium channel blockers and angiotensin blockers. The current treatment provides moderate improvement. Compliance problems include exercise and diet.          Past Medical History:   Diagnosis Date    Hyperlipidemia     Hypertension      Social History     Socioeconomic History    Marital status:      Spouse name: Not on file    Number of children: Not on file    Years of education: Not on file    Highest education level: Not on file   Occupational History    Not on file   Social Needs    Financial resource strain: Not on file    Food insecurity     Worry: Not on file     Inability: Not on file    Transportation needs     Medical: Not on file     Non-medical: Not on file   Tobacco Use    Smoking status: Never Smoker    Smokeless tobacco: Never Used   Substance and Sexual Activity     Alcohol use: No     Frequency: Never    Drug use: No    Sexual activity: Not on file   Lifestyle    Physical activity     Days per week: Not on file     Minutes per session: Not on file    Stress: Only a little   Relationships    Social connections     Talks on phone: Not on file     Gets together: Not on file     Attends [employer] service: Not on file     Active member of club or organization: Not on file     Attends meetings of clubs or organizations: Not on file     Relationship status: Not on file   Other Topics Concern    Not on file   Social History Narrative    Not on file     Past Surgical History:   Procedure Laterality Date    TONSILLECTOMY       Family History   Problem Relation Age of Onset    Cancer Mother     No Known Problems Father      Current Outpatient Medications   Medication Sig Dispense Refill    amlodipine-benazepril 5-10 mg (LOTREL) 5-10 mg per capsule Take 1 capsule by mouth once daily. 30 capsule 11    pravastatin (PRAVACHOL) 80 MG tablet Take 1 tablet (80 mg total) by mouth once daily. 30 tablet 11     No current facility-administered medications for this visit.      Review of patient's allergies indicates:   Allergen Reactions    Amoxicillin        Review of Systems   Constitutional: Negative for activity change, appetite change, fatigue, fever and malaise/fatigue.   HENT: Negative for congestion, ear pain, hearing loss, postnasal drip, sinus pressure, sinus pain, sneezing and sore throat.    Eyes: Negative for blurred vision, photophobia and pain.   Respiratory: Negative for cough, chest tightness, shortness of breath and wheezing.    Cardiovascular: Negative for chest pain, palpitations, orthopnea, leg swelling and PND.   Gastrointestinal: Negative for abdominal distention, abdominal pain, blood in stool, constipation, diarrhea, nausea and vomiting.   Endocrine: Negative for cold intolerance, heat intolerance, polydipsia and polyuria.   Genitourinary: Negative for  "difficulty urinating, dysuria, flank pain, frequency, hematuria and urgency.   Musculoskeletal: Negative for arthralgias, back pain, joint swelling, myalgias and neck pain.   Skin: Negative for pallor and rash.   Allergic/Immunologic: Negative for environmental allergies and food allergies.   Neurological: Negative for dizziness, weakness, light-headedness and headaches.   Hematological: Does not bruise/bleed easily.   Psychiatric/Behavioral: Negative for confusion, decreased concentration and sleep disturbance. The patient is not nervous/anxious.           Blood pressure 122/80, pulse 90, temperature 99.3 °F (37.4 °C), temperature source Oral, resp. rate 18, height 5' 5" (1.651 m), weight 90.6 kg (199 lb 11.2 oz), SpO2 98 %. Body mass index is 33.23 kg/m².   Objective:      Physical Exam  Constitutional:       General: He is not in acute distress.     Appearance: Normal appearance. He is well-developed. He is obese. He is not ill-appearing or toxic-appearing.   HENT:      Head: Normocephalic and atraumatic.      Right Ear: There is impacted cerumen.      Left Ear: There is impacted cerumen.      Nose: Nose normal. No congestion or rhinorrhea.      Mouth/Throat:      Mouth: Mucous membranes are moist.      Pharynx: Oropharynx is clear. No oropharyngeal exudate or posterior oropharyngeal erythema.   Eyes:      General:         Right eye: No discharge.         Left eye: No discharge.      Conjunctiva/sclera: Conjunctivae normal.      Pupils: Pupils are equal, round, and reactive to light.   Cardiovascular:      Rate and Rhythm: Normal rate and regular rhythm.      Heart sounds: Normal heart sounds. No murmur.   Pulmonary:      Effort: Pulmonary effort is normal. No respiratory distress.      Breath sounds: Normal breath sounds. No wheezing.   Skin:     General: Skin is warm and dry.   Neurological:      Mental Status: He is alert and oriented to person, place, and time.             Assessment:       1. Essential " hypertension    2. Other hyperlipidemia    3. Elevated PSA         Plan:           Essential hypertension  Reduce the amount of salt in your diet; Lose weight; Avoid drinking too much alcohol; Exercise at least 30 minutes per day most days of the week.  Continue current medications and home BP monitoring.  Other hyperlipidemia  -     Lipid Panel; Future; Expected date: 08/12/2020    Elevated PSA  -     PSA, Screening; Future; Expected date: 08/12/2020

## 2020-12-30 ENCOUNTER — PATIENT MESSAGE (OUTPATIENT)
Dept: FAMILY MEDICINE | Facility: CLINIC | Age: 70
End: 2020-12-30

## 2021-01-15 ENCOUNTER — IMMUNIZATION (OUTPATIENT)
Dept: PRIMARY CARE CLINIC | Facility: CLINIC | Age: 71
End: 2021-01-15
Payer: MEDICARE

## 2021-01-15 DIAGNOSIS — Z23 NEED FOR VACCINATION: Primary | ICD-10-CM

## 2021-01-15 PROCEDURE — 91300 COVID-19, MRNA, LNP-S, PF, 30 MCG/0.3 ML DOSE VACCINE: ICD-10-PCS | Mod: S$GLB,,, | Performed by: NURSE PRACTITIONER

## 2021-01-15 PROCEDURE — 0001A COVID-19, MRNA, LNP-S, PF, 30 MCG/0.3 ML DOSE VACCINE: ICD-10-PCS | Mod: S$GLB,,, | Performed by: NURSE PRACTITIONER

## 2021-01-15 PROCEDURE — 0001A COVID-19, MRNA, LNP-S, PF, 30 MCG/0.3 ML DOSE VACCINE: CPT | Mod: S$GLB,,, | Performed by: NURSE PRACTITIONER

## 2021-01-15 PROCEDURE — 91300 COVID-19, MRNA, LNP-S, PF, 30 MCG/0.3 ML DOSE VACCINE: CPT | Mod: S$GLB,,, | Performed by: NURSE PRACTITIONER

## 2021-02-05 ENCOUNTER — IMMUNIZATION (OUTPATIENT)
Dept: PRIMARY CARE CLINIC | Facility: CLINIC | Age: 71
End: 2021-02-05
Payer: MEDICARE

## 2021-02-05 DIAGNOSIS — Z23 NEED FOR VACCINATION: Primary | ICD-10-CM

## 2021-02-05 PROCEDURE — 0002A COVID-19, MRNA, LNP-S, PF, 30 MCG/0.3 ML DOSE VACCINE: ICD-10-PCS | Mod: CV19,S$GLB,, | Performed by: FAMILY MEDICINE

## 2021-02-05 PROCEDURE — 91300 COVID-19, MRNA, LNP-S, PF, 30 MCG/0.3 ML DOSE VACCINE: ICD-10-PCS | Mod: S$GLB,,, | Performed by: FAMILY MEDICINE

## 2021-02-05 PROCEDURE — 0002A COVID-19, MRNA, LNP-S, PF, 30 MCG/0.3 ML DOSE VACCINE: CPT | Mod: CV19,S$GLB,, | Performed by: FAMILY MEDICINE

## 2021-02-05 PROCEDURE — 91300 COVID-19, MRNA, LNP-S, PF, 30 MCG/0.3 ML DOSE VACCINE: CPT | Mod: S$GLB,,, | Performed by: FAMILY MEDICINE

## 2021-02-22 ENCOUNTER — LAB VISIT (OUTPATIENT)
Dept: LAB | Facility: HOSPITAL | Age: 71
End: 2021-02-22
Attending: FAMILY MEDICINE
Payer: MEDICARE

## 2021-02-22 DIAGNOSIS — E78.49 OTHER HYPERLIPIDEMIA: ICD-10-CM

## 2021-02-22 DIAGNOSIS — R97.20 ELEVATED PSA: ICD-10-CM

## 2021-02-22 LAB
CHOLEST SERPL-MCNC: 161 MG/DL (ref 120–199)
CHOLEST/HDLC SERPL: 4.7 {RATIO} (ref 2–5)
COMPLEXED PSA SERPL-MCNC: 4.9 NG/ML (ref 0–4)
HDLC SERPL-MCNC: 34 MG/DL (ref 40–75)
HDLC SERPL: 21.1 % (ref 20–50)
LDLC SERPL CALC-MCNC: 106.8 MG/DL (ref 63–159)
NONHDLC SERPL-MCNC: 127 MG/DL
TRIGL SERPL-MCNC: 101 MG/DL (ref 30–150)

## 2021-02-22 PROCEDURE — 84153 ASSAY OF PSA TOTAL: CPT | Mod: GZ

## 2021-02-22 PROCEDURE — 80061 LIPID PANEL: CPT

## 2021-02-22 PROCEDURE — 36415 COLL VENOUS BLD VENIPUNCTURE: CPT

## 2021-03-04 ENCOUNTER — PATIENT MESSAGE (OUTPATIENT)
Dept: FAMILY MEDICINE | Facility: CLINIC | Age: 71
End: 2021-03-04

## 2021-03-05 ENCOUNTER — PATIENT MESSAGE (OUTPATIENT)
Dept: FAMILY MEDICINE | Facility: CLINIC | Age: 71
End: 2021-03-05

## 2021-07-01 ENCOUNTER — PATIENT MESSAGE (OUTPATIENT)
Dept: FAMILY MEDICINE | Facility: CLINIC | Age: 71
End: 2021-07-01

## 2021-07-05 ENCOUNTER — PATIENT MESSAGE (OUTPATIENT)
Dept: FAMILY MEDICINE | Facility: CLINIC | Age: 71
End: 2021-07-05

## 2021-08-09 ENCOUNTER — OFFICE VISIT (OUTPATIENT)
Dept: FAMILY MEDICINE | Facility: CLINIC | Age: 71
End: 2021-08-09
Payer: MEDICARE

## 2021-08-09 VITALS
DIASTOLIC BLOOD PRESSURE: 84 MMHG | BODY MASS INDEX: 33.15 KG/M2 | HEIGHT: 65 IN | HEART RATE: 72 BPM | SYSTOLIC BLOOD PRESSURE: 126 MMHG | WEIGHT: 199 LBS

## 2021-08-09 DIAGNOSIS — E78.2 MIXED HYPERLIPIDEMIA: ICD-10-CM

## 2021-08-09 DIAGNOSIS — N13.8 BPH WITH OBSTRUCTION/LOWER URINARY TRACT SYMPTOMS: ICD-10-CM

## 2021-08-09 DIAGNOSIS — R97.20 ELEVATED PSA: ICD-10-CM

## 2021-08-09 DIAGNOSIS — N40.1 BPH WITH OBSTRUCTION/LOWER URINARY TRACT SYMPTOMS: ICD-10-CM

## 2021-08-09 DIAGNOSIS — I10 ESSENTIAL HYPERTENSION: Primary | ICD-10-CM

## 2021-08-09 DIAGNOSIS — Z12.11 COLON CANCER SCREENING: ICD-10-CM

## 2021-08-09 PROCEDURE — 1101F PR PT FALLS ASSESS DOC 0-1 FALLS W/OUT INJ PAST YR: ICD-10-PCS | Mod: S$GLB,,, | Performed by: FAMILY MEDICINE

## 2021-08-09 PROCEDURE — 3008F PR BODY MASS INDEX (BMI) DOCUMENTED: ICD-10-PCS | Mod: S$GLB,,, | Performed by: FAMILY MEDICINE

## 2021-08-09 PROCEDURE — 3079F PR MOST RECENT DIASTOLIC BLOOD PRESSURE 80-89 MM HG: ICD-10-PCS | Mod: S$GLB,,, | Performed by: FAMILY MEDICINE

## 2021-08-09 PROCEDURE — 3008F BODY MASS INDEX DOCD: CPT | Mod: S$GLB,,, | Performed by: FAMILY MEDICINE

## 2021-08-09 PROCEDURE — 1159F PR MEDICATION LIST DOCUMENTED IN MEDICAL RECORD: ICD-10-PCS | Mod: S$GLB,,, | Performed by: FAMILY MEDICINE

## 2021-08-09 PROCEDURE — 99214 OFFICE O/P EST MOD 30 MIN: CPT | Mod: S$GLB,,, | Performed by: FAMILY MEDICINE

## 2021-08-09 PROCEDURE — 1159F MED LIST DOCD IN RCRD: CPT | Mod: S$GLB,,, | Performed by: FAMILY MEDICINE

## 2021-08-09 PROCEDURE — 99214 PR OFFICE/OUTPT VISIT, EST, LEVL IV, 30-39 MIN: ICD-10-PCS | Mod: S$GLB,,, | Performed by: FAMILY MEDICINE

## 2021-08-09 PROCEDURE — 1101F PT FALLS ASSESS-DOCD LE1/YR: CPT | Mod: S$GLB,,, | Performed by: FAMILY MEDICINE

## 2021-08-09 PROCEDURE — 1160F RVW MEDS BY RX/DR IN RCRD: CPT | Mod: S$GLB,,, | Performed by: FAMILY MEDICINE

## 2021-08-09 PROCEDURE — 3074F PR MOST RECENT SYSTOLIC BLOOD PRESSURE < 130 MM HG: ICD-10-PCS | Mod: S$GLB,,, | Performed by: FAMILY MEDICINE

## 2021-08-09 PROCEDURE — 3079F DIAST BP 80-89 MM HG: CPT | Mod: S$GLB,,, | Performed by: FAMILY MEDICINE

## 2021-08-09 PROCEDURE — 1160F PR REVIEW ALL MEDS BY PRESCRIBER/CLIN PHARMACIST DOCUMENTED: ICD-10-PCS | Mod: S$GLB,,, | Performed by: FAMILY MEDICINE

## 2021-08-09 PROCEDURE — 3288F PR FALLS RISK ASSESSMENT DOCUMENTED: ICD-10-PCS | Mod: S$GLB,,, | Performed by: FAMILY MEDICINE

## 2021-08-09 PROCEDURE — 3074F SYST BP LT 130 MM HG: CPT | Mod: S$GLB,,, | Performed by: FAMILY MEDICINE

## 2021-08-09 PROCEDURE — 3288F FALL RISK ASSESSMENT DOCD: CPT | Mod: S$GLB,,, | Performed by: FAMILY MEDICINE

## 2021-08-09 RX ORDER — PRAVASTATIN SODIUM 80 MG/1
80 TABLET ORAL DAILY
Qty: 90 TABLET | Refills: 3 | Status: SHIPPED | OUTPATIENT
Start: 2021-08-09 | End: 2022-08-08 | Stop reason: SDUPTHER

## 2021-08-09 RX ORDER — AMLODIPINE AND BENAZEPRIL HYDROCHLORIDE 5; 10 MG/1; MG/1
1 CAPSULE ORAL DAILY
Qty: 90 CAPSULE | Refills: 3 | Status: SHIPPED | OUTPATIENT
Start: 2021-08-09 | End: 2022-08-08 | Stop reason: SDUPTHER

## 2021-08-13 LAB
APPEARANCE UR: CLEAR
BACTERIA #/AREA URNS HPF: ABNORMAL /HPF
BACTERIA UR CULT: ABNORMAL
BILIRUB UR QL STRIP: NEGATIVE
COLOR UR: YELLOW
GLUCOSE UR QL STRIP: NEGATIVE
HGB UR QL STRIP: NEGATIVE
HYALINE CASTS #/AREA URNS LPF: ABNORMAL /LPF
KETONES UR QL STRIP: ABNORMAL
LEUKOCYTE ESTERASE UR QL STRIP: NEGATIVE
NITRITE UR QL STRIP: NEGATIVE
PH UR STRIP: ABNORMAL [PH] (ref 5–8)
PROT UR QL STRIP: NEGATIVE
PSA FREE MFR SERPL: 34 % (CALC)
PSA FREE SERPL-MCNC: 1.3 NG/ML
PSA SERPL-MCNC: 3.8 NG/ML
RBC #/AREA URNS HPF: ABNORMAL /HPF
SP GR UR STRIP: 1.03 (ref 1–1.03)
SQUAMOUS #/AREA URNS HPF: ABNORMAL /HPF
WBC #/AREA URNS HPF: ABNORMAL /HPF

## 2021-08-19 LAB — NONINV COLON CA DNA+OCC BLD SCRN STL QL: NEGATIVE

## 2021-10-27 ENCOUNTER — IMMUNIZATION (OUTPATIENT)
Dept: PRIMARY CARE CLINIC | Facility: CLINIC | Age: 71
End: 2021-10-27
Payer: MEDICARE

## 2021-10-27 DIAGNOSIS — Z23 NEED FOR VACCINATION: Primary | ICD-10-CM

## 2021-10-27 PROCEDURE — 91300 COVID-19, MRNA, LNP-S, PF, 30 MCG/0.3 ML DOSE VACCINE: CPT | Mod: S$GLB,,, | Performed by: FAMILY MEDICINE

## 2021-10-27 PROCEDURE — 0003A COVID-19, MRNA, LNP-S, PF, 30 MCG/0.3 ML DOSE VACCINE: ICD-10-PCS | Mod: S$GLB,,, | Performed by: FAMILY MEDICINE

## 2021-10-27 PROCEDURE — 0003A COVID-19, MRNA, LNP-S, PF, 30 MCG/0.3 ML DOSE VACCINE: CPT | Mod: S$GLB,,, | Performed by: FAMILY MEDICINE

## 2021-10-27 PROCEDURE — 91300 COVID-19, MRNA, LNP-S, PF, 30 MCG/0.3 ML DOSE VACCINE: ICD-10-PCS | Mod: S$GLB,,, | Performed by: FAMILY MEDICINE

## 2022-02-08 ENCOUNTER — OFFICE VISIT (OUTPATIENT)
Dept: FAMILY MEDICINE | Facility: CLINIC | Age: 72
End: 2022-02-08
Payer: MEDICARE

## 2022-02-08 VITALS
SYSTOLIC BLOOD PRESSURE: 104 MMHG | BODY MASS INDEX: 33.49 KG/M2 | DIASTOLIC BLOOD PRESSURE: 76 MMHG | HEIGHT: 65 IN | HEART RATE: 88 BPM | WEIGHT: 201 LBS

## 2022-02-08 DIAGNOSIS — E78.2 MIXED HYPERLIPIDEMIA: ICD-10-CM

## 2022-02-08 DIAGNOSIS — I10 ESSENTIAL HYPERTENSION: Primary | ICD-10-CM

## 2022-02-08 DIAGNOSIS — R97.20 ELEVATED PSA: ICD-10-CM

## 2022-02-08 PROCEDURE — 3008F BODY MASS INDEX DOCD: CPT | Mod: S$GLB,,, | Performed by: FAMILY MEDICINE

## 2022-02-08 PROCEDURE — 3008F PR BODY MASS INDEX (BMI) DOCUMENTED: ICD-10-PCS | Mod: S$GLB,,, | Performed by: FAMILY MEDICINE

## 2022-02-08 PROCEDURE — 3066F NEPHROPATHY DOC TX: CPT | Mod: S$GLB,,, | Performed by: FAMILY MEDICINE

## 2022-02-08 PROCEDURE — 3061F PR NEG MICROALBUMINURIA RESULT DOCUMENTED/REVIEW: ICD-10-PCS | Mod: S$GLB,,, | Performed by: FAMILY MEDICINE

## 2022-02-08 PROCEDURE — 3288F FALL RISK ASSESSMENT DOCD: CPT | Mod: S$GLB,,, | Performed by: FAMILY MEDICINE

## 2022-02-08 PROCEDURE — 1159F MED LIST DOCD IN RCRD: CPT | Mod: S$GLB,,, | Performed by: FAMILY MEDICINE

## 2022-02-08 PROCEDURE — 3078F PR MOST RECENT DIASTOLIC BLOOD PRESSURE < 80 MM HG: ICD-10-PCS | Mod: S$GLB,,, | Performed by: FAMILY MEDICINE

## 2022-02-08 PROCEDURE — 3066F PR DOCUMENTATION OF TREATMENT FOR NEPHROPATHY: ICD-10-PCS | Mod: S$GLB,,, | Performed by: FAMILY MEDICINE

## 2022-02-08 PROCEDURE — 99213 PR OFFICE/OUTPT VISIT, EST, LEVL III, 20-29 MIN: ICD-10-PCS | Mod: S$GLB,,, | Performed by: FAMILY MEDICINE

## 2022-02-08 PROCEDURE — 3288F PR FALLS RISK ASSESSMENT DOCUMENTED: ICD-10-PCS | Mod: S$GLB,,, | Performed by: FAMILY MEDICINE

## 2022-02-08 PROCEDURE — 3074F SYST BP LT 130 MM HG: CPT | Mod: S$GLB,,, | Performed by: FAMILY MEDICINE

## 2022-02-08 PROCEDURE — 3061F NEG MICROALBUMINURIA REV: CPT | Mod: S$GLB,,, | Performed by: FAMILY MEDICINE

## 2022-02-08 PROCEDURE — 1101F PT FALLS ASSESS-DOCD LE1/YR: CPT | Mod: S$GLB,,, | Performed by: FAMILY MEDICINE

## 2022-02-08 PROCEDURE — 3078F DIAST BP <80 MM HG: CPT | Mod: S$GLB,,, | Performed by: FAMILY MEDICINE

## 2022-02-08 PROCEDURE — 1159F PR MEDICATION LIST DOCUMENTED IN MEDICAL RECORD: ICD-10-PCS | Mod: S$GLB,,, | Performed by: FAMILY MEDICINE

## 2022-02-08 PROCEDURE — 3074F PR MOST RECENT SYSTOLIC BLOOD PRESSURE < 130 MM HG: ICD-10-PCS | Mod: S$GLB,,, | Performed by: FAMILY MEDICINE

## 2022-02-08 PROCEDURE — 1101F PR PT FALLS ASSESS DOC 0-1 FALLS W/OUT INJ PAST YR: ICD-10-PCS | Mod: S$GLB,,, | Performed by: FAMILY MEDICINE

## 2022-02-08 PROCEDURE — 99213 OFFICE O/P EST LOW 20 MIN: CPT | Mod: S$GLB,,, | Performed by: FAMILY MEDICINE

## 2022-02-08 NOTE — PROGRESS NOTES
SUBJECTIVE:    Patient ID: Jose Morataya is a 71 y.o. male.    Chief Complaint: Hypertension (Went over meds verbally, Eye req [facility]// [facility])    Patient with HTN and HLD in for regular visit. He is doing well on his current regime.  Vitals are good. Labs in appropriate range  Colon cancer screen negative. He had elevated PSA which has resolved        Office Visit on 08/09/2021   Component Date Value Ref Range Status    Color, UA 08/12/2021 YELLOW  YELLOW Final    Appearance, UA 08/12/2021 CLEAR  CLEAR Final    Specific Gravity, UA 08/12/2021 1.027  1.001 - 1.035 Final    pH, UA 08/12/2021 < OR = 5.0  5.0 - 8.0 Final    Glucose, UA 08/12/2021 NEGATIVE  NEGATIVE Final    Bilirubin, UA 08/12/2021 NEGATIVE  NEGATIVE Final    Ketones, UA 08/12/2021 TRACE* NEGATIVE Final    Occult Blood UA 08/12/2021 NEGATIVE  NEGATIVE Final    Protein, UA 08/12/2021 NEGATIVE  NEGATIVE Final    Nitrite, UA 08/12/2021 NEGATIVE  NEGATIVE Final    Leukocytes, UA 08/12/2021 NEGATIVE  NEGATIVE Final    WBC Casts, UA 08/12/2021 NONE SEEN  < OR = 5 /HPF Final    RBC Casts, UA 08/12/2021 0-2  < OR = 2 /HPF Final    Squam Epithel, UA 08/12/2021 NONE SEEN  < OR = 5 /HPF Final    Bacteria, UA 08/12/2021 NONE SEEN  NONE SEEN /HPF Final    Hyaline Casts, UA 08/12/2021 NONE SEEN  NONE SEEN /LPF Final    Reflexive Urine Culture 08/12/2021    Final    Total PSA 08/12/2021 3.8  < OR = 4.0 ng/mL Final    Free PSA 08/12/2021 1.3  ng/mL Final    % Free PSA 08/12/2021 34  >25 % (calc) Final    Cologuard Result 08/13/2021 Negative  Negative Final       Past Medical History:   Diagnosis Date    Hyperlipidemia     Hypertension      Past Surgical History:   Procedure Laterality Date    TONSILLECTOMY       Family History   Problem Relation Age of Onset    Cancer Mother     No Known Problems Father        Marital Status:   Alcohol History:  reports no history of alcohol use.  Tobacco History:  reports that he has never  "smoked. He has never used smokeless tobacco.  Drug History:  reports no history of drug use.    Review of patient's allergies indicates:   Allergen Reactions    Amoxicillin        Current Outpatient Medications:     amlodipine-benazepril 5-10 mg (LOTREL) 5-10 mg per capsule, Take 1 capsule by mouth once daily., Disp: 90 capsule, Rfl: 3    pravastatin (PRAVACHOL) 80 MG tablet, Take 1 tablet (80 mg total) by mouth once daily., Disp: 90 tablet, Rfl: 3    saw-vit E-sod lurdes-lyc-beta-pyg 160-100-100 mg-unit-mcg Tab, Take by mouth., Disp: , Rfl:     Review of Systems   Constitutional: Negative for fatigue, fever and unexpected weight change.   HENT: Negative for congestion, postnasal drip, rhinorrhea and sore throat.    Eyes: Negative for photophobia, pain and visual disturbance.   Respiratory: Negative for cough, shortness of breath and wheezing.    Cardiovascular: Negative for chest pain and palpitations.   Gastrointestinal: Negative for constipation, diarrhea, nausea and vomiting.   Genitourinary: Negative for difficulty urinating, dysuria, frequency, hematuria and urgency.   Musculoskeletal: Positive for arthralgias. Negative for myalgias.   Skin: Negative for rash.   Neurological: Negative for dizziness and headaches.   Psychiatric/Behavioral: Negative for sleep disturbance.          Objective:      Vitals:    02/08/22 1400   BP: 104/76   Pulse: 88   Weight: 91.2 kg (201 lb)   Height: 5' 5" (1.651 m)     Physical Exam  Constitutional:       Appearance: Normal appearance.   HENT:      Head: Normocephalic and atraumatic.      Mouth/Throat:      Mouth: Mucous membranes are moist.   Eyes:      Conjunctiva/sclera: Conjunctivae normal.   Pulmonary:      Effort: Pulmonary effort is normal.   Neurological:      General: No focal deficit present.      Mental Status: He is alert and oriented to person, place, and time.   Psychiatric:         Mood and Affect: Mood normal.         Behavior: Behavior normal.       "     Assessment:       1. Essential hypertension    2. Elevated PSA    3. Mixed hyperlipidemia         Plan:       Essential hypertension  -     Microalbumin/Creatinine Ratio, Urine; Future; Expected date: 02/08/2022  -     Comprehensive Metabolic Panel; Future; Expected date: 02/08/2022    Elevated PSA    Mixed hyperlipidemia  -     Lipid Panel; Future; Expected date: 02/08/2022      Follow up in about 6 months (around 8/8/2022) for HTN.

## 2022-02-11 ENCOUNTER — PATIENT MESSAGE (OUTPATIENT)
Dept: FAMILY MEDICINE | Facility: CLINIC | Age: 72
End: 2022-02-11
Payer: MEDICARE

## 2022-02-12 LAB
ALBUMIN SERPL-MCNC: 4 G/DL (ref 3.6–5.1)
ALBUMIN/CREAT UR: 6 MCG/MG CREAT
ALBUMIN/GLOB SERPL: 1.6 (CALC) (ref 1–2.5)
ALP SERPL-CCNC: 83 U/L (ref 35–144)
ALT SERPL-CCNC: 24 U/L (ref 9–46)
AST SERPL-CCNC: 19 U/L (ref 10–35)
BILIRUB SERPL-MCNC: 0.5 MG/DL (ref 0.2–1.2)
BUN SERPL-MCNC: 17 MG/DL (ref 7–25)
BUN/CREAT SERPL: ABNORMAL (CALC) (ref 6–22)
CALCIUM SERPL-MCNC: 9.2 MG/DL (ref 8.6–10.3)
CHLORIDE SERPL-SCNC: 103 MMOL/L (ref 98–110)
CHOLEST SERPL-MCNC: 148 MG/DL
CHOLEST/HDLC SERPL: 3.7 (CALC)
CO2 SERPL-SCNC: 29 MMOL/L (ref 20–32)
CREAT SERPL-MCNC: 0.92 MG/DL (ref 0.7–1.18)
CREAT UR-MCNC: 175 MG/DL (ref 20–320)
GLOBULIN SER CALC-MCNC: 2.5 G/DL (CALC) (ref 1.9–3.7)
GLUCOSE SERPL-MCNC: 146 MG/DL (ref 65–99)
HDLC SERPL-MCNC: 40 MG/DL
LDLC SERPL CALC-MCNC: 88 MG/DL (CALC)
MICROALBUMIN UR-MCNC: 1.1 MG/DL
NONHDLC SERPL-MCNC: 108 MG/DL (CALC)
POTASSIUM SERPL-SCNC: 4.5 MMOL/L (ref 3.5–5.3)
PROT SERPL-MCNC: 6.5 G/DL (ref 6.1–8.1)
SODIUM SERPL-SCNC: 141 MMOL/L (ref 135–146)
TRIGL SERPL-MCNC: 107 MG/DL

## 2022-03-24 ENCOUNTER — PATIENT MESSAGE (OUTPATIENT)
Dept: FAMILY MEDICINE | Facility: CLINIC | Age: 72
End: 2022-03-24
Payer: MEDICARE

## 2022-03-24 DIAGNOSIS — R30.0 DYSURIA: Primary | ICD-10-CM

## 2022-03-25 ENCOUNTER — PATIENT MESSAGE (OUTPATIENT)
Dept: FAMILY MEDICINE | Facility: CLINIC | Age: 72
End: 2022-03-25
Payer: MEDICARE

## 2022-03-25 DIAGNOSIS — R30.0 DYSURIA: Primary | ICD-10-CM

## 2022-03-26 LAB
APPEARANCE UR: CLEAR
BILIRUB UR QL STRIP: NEGATIVE
COLOR UR: YELLOW
GLUCOSE UR QL STRIP: NEGATIVE
HGB UR QL STRIP: NEGATIVE
KETONES UR QL STRIP: NEGATIVE
LEUKOCYTE ESTERASE UR QL STRIP: NEGATIVE
NITRITE UR QL STRIP: NEGATIVE
PH UR STRIP: 6 [PH] (ref 5–8)
PROT UR QL STRIP: NEGATIVE
SP GR UR STRIP: 1.03 (ref 1–1.03)

## 2022-03-28 RX ORDER — CIPROFLOXACIN 250 MG/1
250 TABLET, FILM COATED ORAL EVERY 12 HOURS
Qty: 28 TABLET | Refills: 0 | Status: SHIPPED | OUTPATIENT
Start: 2022-03-28 | End: 2022-04-11

## 2022-03-30 ENCOUNTER — PATIENT MESSAGE (OUTPATIENT)
Dept: FAMILY MEDICINE | Facility: CLINIC | Age: 72
End: 2022-03-30
Payer: MEDICARE

## 2022-04-13 ENCOUNTER — OCCUPATIONAL HEALTH (OUTPATIENT)
Dept: URGENT CARE | Facility: CLINIC | Age: 72
End: 2022-04-13

## 2022-04-13 PROCEDURE — 80305 DRUG TEST PRSMV DIR OPT OBS: CPT | Mod: S$GLB,,, | Performed by: EMERGENCY MEDICINE

## 2022-04-13 PROCEDURE — 80305 PR COLLECTION ONLY DRUG SCREEN: ICD-10-PCS | Mod: S$GLB,,, | Performed by: EMERGENCY MEDICINE

## 2022-07-20 ENCOUNTER — PATIENT MESSAGE (OUTPATIENT)
Dept: FAMILY MEDICINE | Facility: CLINIC | Age: 72
End: 2022-07-20

## 2022-08-08 ENCOUNTER — OFFICE VISIT (OUTPATIENT)
Dept: FAMILY MEDICINE | Facility: CLINIC | Age: 72
End: 2022-08-08
Payer: MEDICARE

## 2022-08-08 VITALS
BODY MASS INDEX: 33.15 KG/M2 | HEART RATE: 78 BPM | DIASTOLIC BLOOD PRESSURE: 84 MMHG | WEIGHT: 199 LBS | HEIGHT: 65 IN | SYSTOLIC BLOOD PRESSURE: 130 MMHG

## 2022-08-08 DIAGNOSIS — N13.8 BPH WITH OBSTRUCTION/LOWER URINARY TRACT SYMPTOMS: ICD-10-CM

## 2022-08-08 DIAGNOSIS — E66.09 CLASS 1 OBESITY DUE TO EXCESS CALORIES WITH SERIOUS COMORBIDITY AND BODY MASS INDEX (BMI) OF 33.0 TO 33.9 IN ADULT: ICD-10-CM

## 2022-08-08 DIAGNOSIS — I10 ESSENTIAL HYPERTENSION: Primary | ICD-10-CM

## 2022-08-08 DIAGNOSIS — N40.1 BPH WITH OBSTRUCTION/LOWER URINARY TRACT SYMPTOMS: ICD-10-CM

## 2022-08-08 DIAGNOSIS — E78.2 MIXED HYPERLIPIDEMIA: ICD-10-CM

## 2022-08-08 PROBLEM — E66.811 CLASS 1 OBESITY DUE TO EXCESS CALORIES WITH SERIOUS COMORBIDITY AND BODY MASS INDEX (BMI) OF 33.0 TO 33.9 IN ADULT: Status: ACTIVE | Noted: 2022-08-08

## 2022-08-08 PROCEDURE — 3075F PR MOST RECENT SYSTOLIC BLOOD PRESS GE 130-139MM HG: ICD-10-PCS | Mod: CPTII,S$GLB,, | Performed by: FAMILY MEDICINE

## 2022-08-08 PROCEDURE — 3075F SYST BP GE 130 - 139MM HG: CPT | Mod: CPTII,S$GLB,, | Performed by: FAMILY MEDICINE

## 2022-08-08 PROCEDURE — 1159F PR MEDICATION LIST DOCUMENTED IN MEDICAL RECORD: ICD-10-PCS | Mod: CPTII,S$GLB,, | Performed by: FAMILY MEDICINE

## 2022-08-08 PROCEDURE — 3288F PR FALLS RISK ASSESSMENT DOCUMENTED: ICD-10-PCS | Mod: CPTII,S$GLB,, | Performed by: FAMILY MEDICINE

## 2022-08-08 PROCEDURE — 4010F PR ACE/ARB THEARPY RXD/TAKEN: ICD-10-PCS | Mod: CPTII,S$GLB,, | Performed by: FAMILY MEDICINE

## 2022-08-08 PROCEDURE — 1160F RVW MEDS BY RX/DR IN RCRD: CPT | Mod: CPTII,S$GLB,, | Performed by: FAMILY MEDICINE

## 2022-08-08 PROCEDURE — 4010F ACE/ARB THERAPY RXD/TAKEN: CPT | Mod: CPTII,S$GLB,, | Performed by: FAMILY MEDICINE

## 2022-08-08 PROCEDURE — 3066F NEPHROPATHY DOC TX: CPT | Mod: CPTII,S$GLB,, | Performed by: FAMILY MEDICINE

## 2022-08-08 PROCEDURE — 3061F PR NEG MICROALBUMINURIA RESULT DOCUMENTED/REVIEW: ICD-10-PCS | Mod: CPTII,S$GLB,, | Performed by: FAMILY MEDICINE

## 2022-08-08 PROCEDURE — 3066F PR DOCUMENTATION OF TREATMENT FOR NEPHROPATHY: ICD-10-PCS | Mod: CPTII,S$GLB,, | Performed by: FAMILY MEDICINE

## 2022-08-08 PROCEDURE — 3288F FALL RISK ASSESSMENT DOCD: CPT | Mod: CPTII,S$GLB,, | Performed by: FAMILY MEDICINE

## 2022-08-08 PROCEDURE — 3079F PR MOST RECENT DIASTOLIC BLOOD PRESSURE 80-89 MM HG: ICD-10-PCS | Mod: CPTII,S$GLB,, | Performed by: FAMILY MEDICINE

## 2022-08-08 PROCEDURE — 1160F PR REVIEW ALL MEDS BY PRESCRIBER/CLIN PHARMACIST DOCUMENTED: ICD-10-PCS | Mod: CPTII,S$GLB,, | Performed by: FAMILY MEDICINE

## 2022-08-08 PROCEDURE — 3061F NEG MICROALBUMINURIA REV: CPT | Mod: CPTII,S$GLB,, | Performed by: FAMILY MEDICINE

## 2022-08-08 PROCEDURE — 99214 PR OFFICE/OUTPT VISIT, EST, LEVL IV, 30-39 MIN: ICD-10-PCS | Mod: S$GLB,,, | Performed by: FAMILY MEDICINE

## 2022-08-08 PROCEDURE — 3079F DIAST BP 80-89 MM HG: CPT | Mod: CPTII,S$GLB,, | Performed by: FAMILY MEDICINE

## 2022-08-08 PROCEDURE — 99214 OFFICE O/P EST MOD 30 MIN: CPT | Mod: S$GLB,,, | Performed by: FAMILY MEDICINE

## 2022-08-08 PROCEDURE — 1101F PT FALLS ASSESS-DOCD LE1/YR: CPT | Mod: CPTII,S$GLB,, | Performed by: FAMILY MEDICINE

## 2022-08-08 PROCEDURE — 3008F BODY MASS INDEX DOCD: CPT | Mod: CPTII,S$GLB,, | Performed by: FAMILY MEDICINE

## 2022-08-08 PROCEDURE — 3008F PR BODY MASS INDEX (BMI) DOCUMENTED: ICD-10-PCS | Mod: CPTII,S$GLB,, | Performed by: FAMILY MEDICINE

## 2022-08-08 PROCEDURE — 1101F PR PT FALLS ASSESS DOC 0-1 FALLS W/OUT INJ PAST YR: ICD-10-PCS | Mod: CPTII,S$GLB,, | Performed by: FAMILY MEDICINE

## 2022-08-08 PROCEDURE — 1159F MED LIST DOCD IN RCRD: CPT | Mod: CPTII,S$GLB,, | Performed by: FAMILY MEDICINE

## 2022-08-08 RX ORDER — AMLODIPINE AND BENAZEPRIL HYDROCHLORIDE 5; 10 MG/1; MG/1
1 CAPSULE ORAL DAILY
Qty: 90 CAPSULE | Refills: 3 | Status: SHIPPED | OUTPATIENT
Start: 2022-08-08 | End: 2023-08-14 | Stop reason: SDUPTHER

## 2022-08-08 RX ORDER — PRAVASTATIN SODIUM 80 MG/1
80 TABLET ORAL DAILY
Qty: 90 TABLET | Refills: 3 | Status: SHIPPED | OUTPATIENT
Start: 2022-08-08 | End: 2023-08-14 | Stop reason: SDUPTHER

## 2022-08-08 NOTE — PROGRESS NOTES
SUBJECTIVE:   HPI: Jose Morataya  is a 72 y.o. male who presents for annual physical .   Hypertension (No bottles // no complains //abc )    Patient with hypertension and hyperlipidemia presents for his regular evaluation.  He continues on Pravachol and Lotrel with no difficulties.  He has returned back to work full time.  He stays active and independent of his activities of daily living.  He is due to his COVID booster and has taken his pneumonia 13 and 23 vaccinations.  He has had normal cologuard cancer screening.  Eye exam and dental exams are due.    He has concerns about his weight.  He has been stable over the past year and has been making attempts to monitor diet.  Activities increase now he is at work.  He reports he feels well but does have a desire to have small waistline.         Patient Message on 03/24/2022   Component Date Value Ref Range Status    Color, UA 03/25/2022 YELLOW  YELLOW Final    Appearance, UA 03/25/2022 CLEAR  CLEAR Final    Specific [place], UA 03/25/2022 1.026  1.001 - 1.035 Final    pH, UA 03/25/2022 6.0  5.0 - 8.0 Final    Glucose, UA 03/25/2022 NEGATIVE  NEGATIVE Final    Bilirubin, UA 03/25/2022 NEGATIVE  NEGATIVE Final    Ketones, UA 03/25/2022 NEGATIVE  NEGATIVE Final    Occult Blood UA 03/25/2022 NEGATIVE  NEGATIVE Final    Protein, UA 03/25/2022 NEGATIVE  NEGATIVE Final    Nitrite, UA 03/25/2022 NEGATIVE  NEGATIVE Final    Leukocytes, UA 03/25/2022 NEGATIVE  NEGATIVE Final     (Not in a hospital admission)    Review of patient's allergies indicates:   Allergen Reactions    Amoxicillin      Current Outpatient Medications on File Prior to Visit   Medication Sig Dispense Refill    saw-vit E-sod lurdes-lyc-beta-pyg 160-100-100 mg-unit-mcg Tab Take by mouth.      [DISCONTINUED] amlodipine-benazepril 5-10 mg (LOTREL) 5-10 mg per capsule Take 1 capsule by mouth once daily. 90 capsule 3    [DISCONTINUED] pravastatin (PRAVACHOL) 80 MG tablet Take 1 tablet (80 mg total)  by mouth once daily. 90 tablet 3     No current facility-administered medications on file prior to visit.     Past Medical History:   Diagnosis Date    Hyperlipidemia     Hypertension      Past Surgical History:   Procedure Laterality Date    TONSILLECTOMY       Family History   Problem Relation Age of Onset    Cancer Mother     No Known Problems Father      Social History     Tobacco Use    Smoking status: Never Smoker    Smokeless tobacco: Never Used   Substance Use Topics    Alcohol use: No    Drug use: No      Health Maintenance Topics with due status: Not Due       Topic Last Completion Date    Colorectal Cancer Screening 08/13/2021    Influenza Vaccine 09/22/2021    Lipid Panel 02/11/2022     Immunization History   Administered Date(s) Administered    COVID-19, MRNA, LN-S, PF (Pfizer) (Purple Cap) 01/15/2021, 02/05/2021, 10/27/2021    Influenza 10/04/2019, 09/19/2020    Influenza - High Dose - PF (65 years and older) 10/30/2016, 09/24/2017    Influenza - Quadrivalent - High Dose - PF (65 years and older) 09/06/2020, 09/22/2021    Influenza A (H1N1) 2009 Monovalent - IM 01/22/2010       Review of Systems   Constitutional: Negative for fatigue, fever and unexpected weight change.   HENT: Negative for congestion, postnasal drip, rhinorrhea and sore throat.    Eyes: Negative for photophobia, pain and visual disturbance.   Respiratory: Negative for cough, shortness of breath and wheezing.    Cardiovascular: Negative for chest pain and palpitations.   Gastrointestinal: Negative for constipation, diarrhea, nausea and vomiting.   Genitourinary: Negative for difficulty urinating, dysuria, frequency, hematuria and urgency.   Musculoskeletal: Negative for arthralgias and myalgias.   Skin: Negative for rash.   Neurological: Negative for dizziness and headaches.   Psychiatric/Behavioral: Negative for sleep disturbance.      OBJECTIVE:      Vitals:    08/08/22 0742   BP: 130/84   Pulse: 78   Weight: 90.3 kg  "(199 lb)   Height: 5' 5" (1.651 m)     Physical Exam  Constitutional:       Appearance: Normal appearance.   HENT:      Head: Normocephalic and atraumatic.      Mouth/Throat:      Mouth: Mucous membranes are moist.   Eyes:      Conjunctiva/sclera: Conjunctivae normal.   Pulmonary:      Effort: Pulmonary effort is normal.   Neurological:      General: No focal deficit present.      Mental Status: He is alert and oriented to person, place, and time.   Psychiatric:         Mood and Affect: Mood normal.         Behavior: Behavior normal.        Assessment:       1. Essential hypertension    2. Mixed hyperlipidemia    3. BPH with obstruction/lower urinary tract symptoms    4. Class 1 obesity due to excess calories with serious comorbidity and body mass index (BMI) of 33.0 to 33.9 in adult        Plan:       Essential hypertension  -     amlodipine-benazepril 5-10 mg (LOTREL) 5-10 mg per capsule; Take 1 capsule by mouth once daily.  Dispense: 90 capsule; Refill: 3    Mixed hyperlipidemia  -     pravastatin (PRAVACHOL) 80 MG tablet; Take 1 tablet (80 mg total) by mouth once daily.  Dispense: 90 tablet; Refill: 3    BPH with obstruction/lower urinary tract symptoms    Class 1 obesity due to excess calories with serious comorbidity and body mass index (BMI) of 33.0 to 33.9 in adult        Counseled on age and gender appropriate medical preventative services, including cancer screenings, immunizations, overall nutritional health, need for a consistent exercise regimen and an overall push towards maintaining a vigorous and active lifestyle.      Follow up in about 6 months (around 2/8/2023) for Annual Physical.                "

## 2022-08-15 ENCOUNTER — IMMUNIZATION (OUTPATIENT)
Dept: PRIMARY CARE CLINIC | Facility: CLINIC | Age: 72
End: 2022-08-15
Payer: MEDICARE

## 2022-08-15 DIAGNOSIS — Z23 NEED FOR VACCINATION: Primary | ICD-10-CM

## 2022-08-15 PROCEDURE — 91305 COVID-19, MRNA, LNP-S, PF, 30 MCG/0.3 ML DOSE VACCINE (PFIZER): CPT | Mod: S$GLB,,, | Performed by: FAMILY MEDICINE

## 2022-08-15 PROCEDURE — 91305 COVID-19, MRNA, LNP-S, PF, 30 MCG/0.3 ML DOSE VACCINE (PFIZER): ICD-10-PCS | Mod: S$GLB,,, | Performed by: FAMILY MEDICINE

## 2022-08-15 PROCEDURE — 0054A COVID-19, MRNA, LNP-S, PF, 30 MCG/0.3 ML DOSE VACCINE (PFIZER): CPT | Mod: S$GLB,,, | Performed by: FAMILY MEDICINE

## 2022-08-15 PROCEDURE — 0054A COVID-19, MRNA, LNP-S, PF, 30 MCG/0.3 ML DOSE VACCINE (PFIZER): ICD-10-PCS | Mod: S$GLB,,, | Performed by: FAMILY MEDICINE

## 2023-02-01 ENCOUNTER — TELEPHONE (OUTPATIENT)
Dept: FAMILY MEDICINE | Facility: CLINIC | Age: 73
End: 2023-02-01

## 2023-02-01 DIAGNOSIS — I10 ESSENTIAL HYPERTENSION: ICD-10-CM

## 2023-02-01 DIAGNOSIS — R97.20 ELEVATED PSA: ICD-10-CM

## 2023-02-01 DIAGNOSIS — Z12.5 SCREENING PSA (PROSTATE SPECIFIC ANTIGEN): Primary | ICD-10-CM

## 2023-02-01 DIAGNOSIS — E78.2 MIXED HYPERLIPIDEMIA: ICD-10-CM

## 2023-02-01 NOTE — TELEPHONE ENCOUNTER
Lab orders pended to [facility].  Left message to notify patient to complete fasting labs one week prior to visit.  Informed patient to return call with questions/concerns -DN

## 2023-02-01 NOTE — TELEPHONE ENCOUNTER
----- Message from Lisa Durant MD sent at 8/8/2022  8:12 AM CDT -----  Remind patient to get labs prior to upcoming appointment     Please pend Male plus 50 labs

## 2023-02-04 LAB
ALBUMIN SERPL-MCNC: 3.9 G/DL (ref 3.6–5.1)
ALBUMIN/CREAT UR: 4 MCG/MG CREAT
ALBUMIN/GLOB SERPL: 1.8 (CALC) (ref 1–2.5)
ALP SERPL-CCNC: 77 U/L (ref 35–144)
ALT SERPL-CCNC: 13 U/L (ref 9–46)
APPEARANCE UR: CLEAR
AST SERPL-CCNC: 13 U/L (ref 10–35)
BACTERIA #/AREA URNS HPF: NORMAL /HPF
BACTERIA UR CULT: NORMAL
BASOPHILS # BLD AUTO: 91 CELLS/UL (ref 0–200)
BASOPHILS NFR BLD AUTO: 1.2 %
BILIRUB SERPL-MCNC: 0.6 MG/DL (ref 0.2–1.2)
BILIRUB UR QL STRIP: NEGATIVE
BUN SERPL-MCNC: 15 MG/DL (ref 7–25)
BUN/CREAT SERPL: ABNORMAL (CALC) (ref 6–22)
CALCIUM SERPL-MCNC: 8.8 MG/DL (ref 8.6–10.3)
CHLORIDE SERPL-SCNC: 107 MMOL/L (ref 98–110)
CHOLEST SERPL-MCNC: 137 MG/DL
CHOLEST/HDLC SERPL: 3.5 (CALC)
CO2 SERPL-SCNC: 29 MMOL/L (ref 20–32)
COLOR UR: YELLOW
CREAT SERPL-MCNC: 0.83 MG/DL (ref 0.7–1.28)
CREAT UR-MCNC: 156 MG/DL (ref 20–320)
EGFR: 92 ML/MIN/1.73M2
EOSINOPHIL # BLD AUTO: 509 CELLS/UL (ref 15–500)
EOSINOPHIL NFR BLD AUTO: 6.7 %
ERYTHROCYTE [DISTWIDTH] IN BLOOD BY AUTOMATED COUNT: 13.2 % (ref 11–15)
GLOBULIN SER CALC-MCNC: 2.2 G/DL (CALC) (ref 1.9–3.7)
GLUCOSE SERPL-MCNC: 113 MG/DL (ref 65–99)
GLUCOSE UR QL STRIP: NEGATIVE
HCT VFR BLD AUTO: 48.1 % (ref 38.5–50)
HDLC SERPL-MCNC: 39 MG/DL
HGB BLD-MCNC: 16.4 G/DL (ref 13.2–17.1)
HGB UR QL STRIP: NEGATIVE
HYALINE CASTS #/AREA URNS LPF: NORMAL /LPF
KETONES UR QL STRIP: NEGATIVE
LDLC SERPL CALC-MCNC: 79 MG/DL (CALC)
LEUKOCYTE ESTERASE UR QL STRIP: NEGATIVE
LYMPHOCYTES # BLD AUTO: 2158 CELLS/UL (ref 850–3900)
LYMPHOCYTES NFR BLD AUTO: 28.4 %
MCH RBC QN AUTO: 30.3 PG (ref 27–33)
MCHC RBC AUTO-ENTMCNC: 34.1 G/DL (ref 32–36)
MCV RBC AUTO: 88.9 FL (ref 80–100)
MICROALBUMIN UR-MCNC: 0.6 MG/DL
MONOCYTES # BLD AUTO: 593 CELLS/UL (ref 200–950)
MONOCYTES NFR BLD AUTO: 7.8 %
NEUTROPHILS # BLD AUTO: 4248 CELLS/UL (ref 1500–7800)
NEUTROPHILS NFR BLD AUTO: 55.9 %
NITRITE UR QL STRIP: NEGATIVE
NONHDLC SERPL-MCNC: 98 MG/DL (CALC)
PH UR STRIP: 5.5 [PH] (ref 5–8)
PLATELET # BLD AUTO: 216 THOUSAND/UL (ref 140–400)
PMV BLD REES-ECKER: 10.2 FL (ref 7.5–12.5)
POTASSIUM SERPL-SCNC: 4.5 MMOL/L (ref 3.5–5.3)
PROT SERPL-MCNC: 6.1 G/DL (ref 6.1–8.1)
PROT UR QL STRIP: NEGATIVE
PSA SERPL-MCNC: 3.69 NG/ML
RBC # BLD AUTO: 5.41 MILLION/UL (ref 4.2–5.8)
RBC #/AREA URNS HPF: NORMAL /HPF
SERVICE CMNT-IMP: NORMAL
SODIUM SERPL-SCNC: 142 MMOL/L (ref 135–146)
SP GR UR STRIP: 1.02 (ref 1–1.03)
SQUAMOUS #/AREA URNS HPF: NORMAL /HPF
TRIGL SERPL-MCNC: 105 MG/DL
TSH SERPL-ACNC: 2.9 MIU/L (ref 0.4–4.5)
WBC # BLD AUTO: 7.6 THOUSAND/UL (ref 3.8–10.8)
WBC #/AREA URNS HPF: NORMAL /HPF

## 2023-02-08 ENCOUNTER — OFFICE VISIT (OUTPATIENT)
Dept: FAMILY MEDICINE | Facility: CLINIC | Age: 73
End: 2023-02-08
Payer: MEDICARE

## 2023-02-08 VITALS
DIASTOLIC BLOOD PRESSURE: 72 MMHG | BODY MASS INDEX: 35.68 KG/M2 | HEIGHT: 63 IN | WEIGHT: 201.38 LBS | SYSTOLIC BLOOD PRESSURE: 126 MMHG | HEART RATE: 97 BPM | OXYGEN SATURATION: 95 %

## 2023-02-08 DIAGNOSIS — K21.9 GASTROESOPHAGEAL REFLUX DISEASE WITHOUT ESOPHAGITIS: Primary | ICD-10-CM

## 2023-02-08 DIAGNOSIS — I10 ESSENTIAL HYPERTENSION: ICD-10-CM

## 2023-02-08 DIAGNOSIS — E78.2 MIXED HYPERLIPIDEMIA: ICD-10-CM

## 2023-02-08 DIAGNOSIS — E66.01 SEVERE OBESITY (BMI 35.0-39.9) WITH COMORBIDITY: ICD-10-CM

## 2023-02-08 PROCEDURE — 3008F PR BODY MASS INDEX (BMI) DOCUMENTED: ICD-10-PCS | Mod: CPTII,S$GLB,, | Performed by: FAMILY MEDICINE

## 2023-02-08 PROCEDURE — 1159F PR MEDICATION LIST DOCUMENTED IN MEDICAL RECORD: ICD-10-PCS | Mod: CPTII,S$GLB,, | Performed by: FAMILY MEDICINE

## 2023-02-08 PROCEDURE — 3061F PR NEG MICROALBUMINURIA RESULT DOCUMENTED/REVIEW: ICD-10-PCS | Mod: CPTII,S$GLB,, | Performed by: FAMILY MEDICINE

## 2023-02-08 PROCEDURE — 99214 PR OFFICE/OUTPT VISIT, EST, LEVL IV, 30-39 MIN: ICD-10-PCS | Mod: S$GLB,,, | Performed by: FAMILY MEDICINE

## 2023-02-08 PROCEDURE — 1101F PR PT FALLS ASSESS DOC 0-1 FALLS W/OUT INJ PAST YR: ICD-10-PCS | Mod: CPTII,S$GLB,, | Performed by: FAMILY MEDICINE

## 2023-02-08 PROCEDURE — 99214 OFFICE O/P EST MOD 30 MIN: CPT | Mod: S$GLB,,, | Performed by: FAMILY MEDICINE

## 2023-02-08 PROCEDURE — 1159F MED LIST DOCD IN RCRD: CPT | Mod: CPTII,S$GLB,, | Performed by: FAMILY MEDICINE

## 2023-02-08 PROCEDURE — 3288F FALL RISK ASSESSMENT DOCD: CPT | Mod: CPTII,S$GLB,, | Performed by: FAMILY MEDICINE

## 2023-02-08 PROCEDURE — 3078F DIAST BP <80 MM HG: CPT | Mod: CPTII,S$GLB,, | Performed by: FAMILY MEDICINE

## 2023-02-08 PROCEDURE — 3078F PR MOST RECENT DIASTOLIC BLOOD PRESSURE < 80 MM HG: ICD-10-PCS | Mod: CPTII,S$GLB,, | Performed by: FAMILY MEDICINE

## 2023-02-08 PROCEDURE — 3066F PR DOCUMENTATION OF TREATMENT FOR NEPHROPATHY: ICD-10-PCS | Mod: CPTII,S$GLB,, | Performed by: FAMILY MEDICINE

## 2023-02-08 PROCEDURE — 3288F PR FALLS RISK ASSESSMENT DOCUMENTED: ICD-10-PCS | Mod: CPTII,S$GLB,, | Performed by: FAMILY MEDICINE

## 2023-02-08 PROCEDURE — 4010F ACE/ARB THERAPY RXD/TAKEN: CPT | Mod: CPTII,S$GLB,, | Performed by: FAMILY MEDICINE

## 2023-02-08 PROCEDURE — 3074F PR MOST RECENT SYSTOLIC BLOOD PRESSURE < 130 MM HG: ICD-10-PCS | Mod: CPTII,S$GLB,, | Performed by: FAMILY MEDICINE

## 2023-02-08 PROCEDURE — 4010F PR ACE/ARB THEARPY RXD/TAKEN: ICD-10-PCS | Mod: CPTII,S$GLB,, | Performed by: FAMILY MEDICINE

## 2023-02-08 PROCEDURE — 3066F NEPHROPATHY DOC TX: CPT | Mod: CPTII,S$GLB,, | Performed by: FAMILY MEDICINE

## 2023-02-08 PROCEDURE — 3061F NEG MICROALBUMINURIA REV: CPT | Mod: CPTII,S$GLB,, | Performed by: FAMILY MEDICINE

## 2023-02-08 PROCEDURE — 3074F SYST BP LT 130 MM HG: CPT | Mod: CPTII,S$GLB,, | Performed by: FAMILY MEDICINE

## 2023-02-08 PROCEDURE — 1101F PT FALLS ASSESS-DOCD LE1/YR: CPT | Mod: CPTII,S$GLB,, | Performed by: FAMILY MEDICINE

## 2023-02-08 PROCEDURE — 3008F BODY MASS INDEX DOCD: CPT | Mod: CPTII,S$GLB,, | Performed by: FAMILY MEDICINE

## 2023-02-08 NOTE — PROGRESS NOTES
SUBJECTIVE:   HPI: Jose Morataya  is a 72 y.o. male who presents for annual physical .   Annual Exam (Annual exam/ discuss heartburn/ had eye appt in Nov/ declines PNA vaccine//mp)    Reports 1 week history of chest pressure and sour stomach. Reports midsternal pressure that responded  to otc  charcoal tablets. . Then he had some diarrhea. This has resolved. However has some acid sensation in his throat at night when lying down.  Reports no trouble swallowing.  Has no issues with his bowels currently.    Patient is up-to-date with COVID and flu vaccinations.  He has had shingles and pneumonia vaccines as well.  He has also had eye evaluation with no issues.    Weight is stable and blood pressure is well controlled on current therapy..  Labs are performed and are all within normal limits including CBC, CMP and thyroid.  PSA is normal.  Patient is currently on pravastatin for hyperlipidemia and lipid values are all within appropriate range.        Telephone on 02/01/2023   Component Date Value Ref Range Status    WBC 02/03/2023 7.6  3.8 - 10.8 Thousand/uL Final    RBC 02/03/2023 5.41  4.20 - 5.80 Million/uL Final    Hemoglobin 02/03/2023 16.4  13.2 - 17.1 g/dL Final    Hematocrit 02/03/2023 48.1  38.5 - 50.0 % Final    MCV 02/03/2023 88.9  80.0 - 100.0 fL Final    MCH 02/03/2023 30.3  27.0 - 33.0 pg Final    MCHC 02/03/2023 34.1  32.0 - 36.0 g/dL Final    RDW 02/03/2023 13.2  11.0 - 15.0 % Final    Platelets 02/03/2023 216  140 - 400 Thousand/uL Final    MPV 02/03/2023 10.2  7.5 - 12.5 fL Final    Neutrophils, Abs 02/03/2023 4,248  1,500 - 7,800 cells/uL Final    Lymph # 02/03/2023 2,158  850 - 3,900 cells/uL Final    Mono # 02/03/2023 593  200 - 950 cells/uL Final    Eos # 02/03/2023 509 (H)  15 - 500 cells/uL Final    Baso # 02/03/2023 91  0 - 200 cells/uL Final    Neutrophils Relative 02/03/2023 55.9  % Final    Lymph % 02/03/2023 28.4  % Final    Mono % 02/03/2023 7.8  % Final    Eosinophil % 02/03/2023 6.7   % Final    Basophil % 02/03/2023 1.2  % Final    Glucose 02/03/2023 113 (H)  65 - 99 mg/dL Final    BUN 02/03/2023 15  7 - 25 mg/dL Final    Creatinine 02/03/2023 0.83  0.70 - 1.28 mg/dL Final    eGFR 02/03/2023 92  > OR = 60 mL/min/1.73m2 Final    BUN/Creatinine Ratio 02/03/2023 NOT APPLICABLE  6 - 22 (calc) Final    Sodium 02/03/2023 142  135 - 146 mmol/L Final    Potassium 02/03/2023 4.5  3.5 - 5.3 mmol/L Final    Chloride 02/03/2023 107  98 - 110 mmol/L Final    CO2 02/03/2023 29  20 - 32 mmol/L Final    Calcium 02/03/2023 8.8  8.6 - 10.3 mg/dL Final    Total Protein 02/03/2023 6.1  6.1 - 8.1 g/dL Final    Albumin 02/03/2023 3.9  3.6 - 5.1 g/dL Final    Globulin, Total 02/03/2023 2.2  1.9 - 3.7 g/dL (calc) Final    Albumin/Globulin Ratio 02/03/2023 1.8  1.0 - 2.5 (calc) Final    Total Bilirubin 02/03/2023 0.6  0.2 - 1.2 mg/dL Final    Alkaline Phosphatase 02/03/2023 77  35 - 144 U/L Final    AST 02/03/2023 13  10 - 35 U/L Final    ALT 02/03/2023 13  9 - 46 U/L Final    Cholesterol 02/03/2023 137  <200 mg/dL Final    HDL 02/03/2023 39 (L)  > OR = 40 mg/dL Final    Triglycerides 02/03/2023 105  <150 mg/dL Final    LDL Cholesterol 02/03/2023 79  mg/dL (calc) Final    HDL/Cholesterol Ratio 02/03/2023 3.5  <5.0 (calc) Final    Non HDL Chol. (LDL+VLDL) 02/03/2023 98  <130 mg/dL (calc) Final    TSH w/reflex to FT4 02/03/2023 2.90  0.40 - 4.50 mIU/L Final    Color, UA 02/03/2023 YELLOW  YELLOW Final    Appearance, UA 02/03/2023 CLEAR  CLEAR Final    Specific [place], UA 02/03/2023 1.021  1.001 - 1.035 Final    pH, UA 02/03/2023 5.5  5.0 - 8.0 Final    Glucose, UA 02/03/2023 NEGATIVE  NEGATIVE Final    Bilirubin, UA 02/03/2023 NEGATIVE  NEGATIVE Final    Ketones, UA 02/03/2023 NEGATIVE  NEGATIVE Final    Occult Blood UA 02/03/2023 NEGATIVE  NEGATIVE Final    Protein, UA 02/03/2023 NEGATIVE  NEGATIVE Final    Nitrite, UA 02/03/2023 NEGATIVE  NEGATIVE Final    Leukocytes, UA 02/03/2023 NEGATIVE  NEGATIVE Final    WBC  Casts, UA 02/03/2023 NONE SEEN  < OR = 5 /HPF Final    RBC Casts, UA 02/03/2023 NONE SEEN  < OR = 2 /HPF Final    Squam Epithel, UA 02/03/2023 NONE SEEN  < OR = 5 /HPF Final    Bacteria, UA 02/03/2023 NONE SEEN  NONE SEEN /HPF Final    Hyaline Casts, UA 02/03/2023 NONE SEEN  NONE SEEN /LPF Final    Service Cmt: 02/03/2023    Final    Reflexive Urine Culture 02/03/2023    Final    PROSTATE SPECIFIC ANTIGEN, SCR - Q* 02/03/2023 3.69  < OR = 4.00 ng/mL Final    Creatinine, Urine 02/03/2023 156  20 - 320 mg/dL Final    Microalb, Ur 02/03/2023 0.6  See Note: mg/dL Final    Microalb/Creat Ratio 02/03/2023 4  <30 mcg/mg creat Final      (Not in a hospital admission)    Review of patient's allergies indicates:   Allergen Reactions    Amoxicillin      Current Outpatient Medications on File Prior to Visit   Medication Sig Dispense Refill    amlodipine-benazepril 5-10 mg (LOTREL) 5-10 mg per capsule Take 1 capsule by mouth once daily. 90 capsule 3    pravastatin (PRAVACHOL) 80 MG tablet Take 1 tablet (80 mg total) by mouth once daily. 90 tablet 3     No current facility-administered medications on file prior to visit.     Past Medical History:   Diagnosis Date    Hyperlipidemia     Hypertension      Past Surgical History:   Procedure Laterality Date    TONSILLECTOMY       Family History   Problem Relation Age of Onset    Cancer Mother     No Known Problems Father      Social History     Tobacco Use    Smoking status: Never    Smokeless tobacco: Never   Substance Use Topics    Alcohol use: No    Drug use: No      Health Maintenance Topics with due status: Not Due       Topic Last Completion Date    Colorectal Cancer Screening 08/13/2021    Lipid Panel 02/03/2023     Immunization History   Administered Date(s) Administered    COVID-19, MRNA, LN-S, PF (Pfizer) (Gray Cap) 08/15/2022    COVID-19, MRNA, LN-S, PF (Pfizer) (Purple Cap) 01/15/2021, 02/05/2021, 10/27/2021    Influenza 10/04/2019, 09/19/2020    Influenza - High Dose - PF  "(65 years and older) 10/30/2016, 09/24/2017    Influenza - Quadrivalent - High Dose - PF (65 years and older) 09/06/2020, 09/22/2021, 09/22/2022    Influenza A (H1N1) 2009 Monovalent - IM 01/22/2010       Review of Systems   Constitutional:  Negative for fatigue, fever and unexpected weight change.   HENT:  Negative for congestion, postnasal drip, rhinorrhea and sore throat.    Eyes:  Negative for photophobia, pain and visual disturbance.   Respiratory:  Negative for cough, shortness of breath and wheezing.    Cardiovascular:  Negative for chest pain and palpitations.   Gastrointestinal:  Negative for constipation, diarrhea, nausea and vomiting.   Genitourinary:  Negative for difficulty urinating, dysuria, frequency, hematuria and urgency.   Musculoskeletal:  Negative for arthralgias and myalgias.   Skin:  Negative for rash.   Neurological:  Negative for dizziness and headaches.   Psychiatric/Behavioral:  Negative for sleep disturbance.     OBJECTIVE:      Vitals:    02/08/23 0954   BP: 126/72   Pulse: 97   SpO2: 95%   Weight: 91.4 kg (201 lb 6.4 oz)   Height: 5' 3" (1.6 m)     Physical Exam  Vitals reviewed.   Constitutional:       General: He is not in acute distress.     Appearance: He is well-developed. He is obese.   HENT:      Head: Normocephalic and atraumatic.      Right Ear: External ear normal.      Left Ear: External ear normal.      Nose: Nose normal.      Mouth/Throat:      Mouth: Mucous membranes are moist.   Eyes:      Conjunctiva/sclera: Conjunctivae normal.      Pupils: Pupils are equal, round, and reactive to light.   Neck:      Thyroid: No thyromegaly.   Cardiovascular:      Rate and Rhythm: Normal rate and regular rhythm.      Pulses: Normal pulses.      Heart sounds: No murmur heard.  Pulmonary:      Effort: Pulmonary effort is normal.      Breath sounds: Normal breath sounds.   Abdominal:      General: Bowel sounds are normal.      Palpations: Abdomen is soft. There is no mass.      Tenderness: " There is no abdominal tenderness.   Musculoskeletal:         General: No tenderness. Normal range of motion.      Cervical back: Normal range of motion and neck supple.   Skin:     General: Skin is warm and dry.      Findings: No rash.   Neurological:      General: No focal deficit present.      Mental Status: He is alert and oriented to person, place, and time.      Cranial Nerves: No cranial nerve deficit.   Psychiatric:         Mood and Affect: Mood normal.         Behavior: Behavior normal.      Assessment:       1. Gastroesophageal reflux disease without esophagitis    2. Essential hypertension    3. Mixed hyperlipidemia    4. Severe obesity (BMI 35.0-39.9) with comorbidity        Plan:       Gastroesophageal reflux disease without esophagitis  -     Ambulatory referral/consult to Gastroenterology; Future; Expected date: 02/15/2023    Essential hypertension    Mixed hyperlipidemia    Severe obesity (BMI 35.0-39.9) with comorbidity        Counseled on age and gender appropriate medical preventative services, including cancer screenings, immunizations, overall nutritional health, need for a consistent exercise regimen and an overall push towards maintaining a vigorous and active lifestyle.      Follow up in about 6 months (around 8/8/2023) for HTN.

## 2023-03-09 ENCOUNTER — HOSPITAL ENCOUNTER (EMERGENCY)
Facility: HOSPITAL | Age: 73
Discharge: HOME | End: 2023-03-09
Attending: EMERGENCY MEDICINE
Payer: MEDICARE

## 2023-03-09 ENCOUNTER — PATIENT MESSAGE (OUTPATIENT)
Dept: FAMILY MEDICINE | Facility: CLINIC | Age: 73
End: 2023-03-09

## 2023-03-09 VITALS
DIASTOLIC BLOOD PRESSURE: 98 MMHG | BODY MASS INDEX: 35.44 KG/M2 | HEART RATE: 102 BPM | RESPIRATION RATE: 18 BRPM | OXYGEN SATURATION: 94 % | SYSTOLIC BLOOD PRESSURE: 159 MMHG | TEMPERATURE: 99 F | WEIGHT: 200 LBS | HEIGHT: 63 IN

## 2023-03-09 DIAGNOSIS — H61.23 BILATERAL IMPACTED CERUMEN: Primary | ICD-10-CM

## 2023-03-09 DIAGNOSIS — H66.90 EAR INFECTION: Primary | ICD-10-CM

## 2023-03-09 PROCEDURE — 99282 EMERGENCY DEPT VISIT SF MDM: CPT

## 2023-03-09 NOTE — DISCHARGE INSTRUCTIONS
Continue Z-Clif as directed until all gone  Follow-up as directed  Return if condition becomes worse for any concerns

## 2023-03-09 NOTE — ED PROVIDER NOTES
Encounter Date: 3/9/2023       History     Chief Complaint   Patient presents with    Otalgia     Pt on zpack for sinus but his ears are still bothering him.      72-year-old male presents to the  emergency department with complaint of left ear pain.  Patient states he was seen a local urgent care for same complaint he is currently on a Z-Clif he denies any headache, nausea, dizziness or recent fevers.  The patient states he came to the emergency department to possibly see an ENT he reports that he had the same complaint previously when he needed an ENT to flush his ears    Review of patient's allergies indicates:   Allergen Reactions    Amoxicillin      Past Medical History:   Diagnosis Date    Hyperlipidemia     Hypertension      Past Surgical History:   Procedure Laterality Date    TONSILLECTOMY       Family History   Problem Relation Age of Onset    Cancer Mother     No Known Problems Father      Social History     Tobacco Use    Smoking status: Never    Smokeless tobacco: Never   Substance Use Topics    Alcohol use: No    Drug use: No     Review of Systems   Constitutional: Negative.    HENT:  Positive for ear pain.    Respiratory: Negative.     Cardiovascular: Negative.    Gastrointestinal: Negative.    Genitourinary: Negative.    Musculoskeletal: Negative.    Skin: Negative.    Neurological: Negative.    Hematological: Negative.    Psychiatric/Behavioral: Negative.     All other systems reviewed and are negative.    Physical Exam     Initial Vitals [03/09/23 1436]   BP Pulse Resp Temp SpO2   (!) 159/98 102 18 98.6 °F (37 °C) (!) 94 %      MAP       --         Physical Exam    Nursing note and vitals reviewed.  Constitutional: He appears well-developed and well-nourished.   HENT:   Head: Normocephalic.   Right Ear: No mastoid tenderness.   Left Ear: No mastoid tenderness.   Patient has bilateral cerumen impactions, no pain with manipulation of pinna, no evidence of external otitis, no pain to the mastoid with  palpation, no mastoid erythema     Neurological: He is alert and oriented to person, place, and time. He has normal strength. GCS score is 15. GCS eye subscore is 4. GCS verbal subscore is 5. GCS motor subscore is 6.   Skin: Skin is warm. No rash noted.   Psychiatric: He has a normal mood and affect.       ED Course   Procedures  Labs Reviewed - No data to display       Imaging Results    None          Medications - No data to display                           Clinical Impression:   Final diagnoses:  [H61.23] Bilateral impacted cerumen (Primary)        ED Disposition Condition    Discharge Stable          ED Prescriptions    None       Follow-up Information       Follow up With Specialties Details Why Contact Info    Lisa Durant MD Family Medicine Schedule an appointment as soon as possible for a visit in 2 days  [address] [facility]  [address]  [place] [address]  [phone]      Dawit Matamoros MD Otolaryngology Schedule an appointment as soon as possible for a visit in 2 days  [address] [facility]  [place] [address]  [phone]               SERGEI Parks  03/09/23 9891

## 2023-08-14 ENCOUNTER — TELEPHONE (OUTPATIENT)
Dept: FAMILY MEDICINE | Facility: CLINIC | Age: 73
End: 2023-08-14

## 2023-08-14 ENCOUNTER — OFFICE VISIT (OUTPATIENT)
Dept: FAMILY MEDICINE | Facility: CLINIC | Age: 73
End: 2023-08-14
Payer: MEDICARE

## 2023-08-14 VITALS
WEIGHT: 202 LBS | HEIGHT: 63 IN | HEART RATE: 72 BPM | DIASTOLIC BLOOD PRESSURE: 82 MMHG | BODY MASS INDEX: 35.79 KG/M2 | SYSTOLIC BLOOD PRESSURE: 126 MMHG

## 2023-08-14 DIAGNOSIS — E78.2 MIXED HYPERLIPIDEMIA: ICD-10-CM

## 2023-08-14 DIAGNOSIS — R97.20 ELEVATED PSA, LESS THAN 10 NG/ML: ICD-10-CM

## 2023-08-14 DIAGNOSIS — Z23 NEED FOR PNEUMOCOCCAL 20-VALENT CONJUGATE VACCINATION: ICD-10-CM

## 2023-08-14 DIAGNOSIS — Z23 NEED FOR SHINGLES VACCINE: ICD-10-CM

## 2023-08-14 DIAGNOSIS — N40.1 BPH WITH OBSTRUCTION/LOWER URINARY TRACT SYMPTOMS: ICD-10-CM

## 2023-08-14 DIAGNOSIS — E66.01 CLASS 2 SEVERE OBESITY DUE TO EXCESS CALORIES WITH SERIOUS COMORBIDITY AND BODY MASS INDEX (BMI) OF 35.0 TO 35.9 IN ADULT: ICD-10-CM

## 2023-08-14 DIAGNOSIS — R73.01 IMPAIRED FASTING GLUCOSE: ICD-10-CM

## 2023-08-14 DIAGNOSIS — N13.8 BPH WITH OBSTRUCTION/LOWER URINARY TRACT SYMPTOMS: ICD-10-CM

## 2023-08-14 DIAGNOSIS — I10 ESSENTIAL HYPERTENSION: Primary | ICD-10-CM

## 2023-08-14 PROBLEM — E66.812 CLASS 2 SEVERE OBESITY DUE TO EXCESS CALORIES WITH SERIOUS COMORBIDITY AND BODY MASS INDEX (BMI) OF 35.0 TO 35.9 IN ADULT: Status: ACTIVE | Noted: 2022-08-08

## 2023-08-14 PROBLEM — M79.10 MYALGIA: Status: RESOLVED | Noted: 2017-03-29 | Resolved: 2023-08-14

## 2023-08-14 PROBLEM — M79.10 MYALGIA: Status: ACTIVE | Noted: 2017-03-29

## 2023-08-14 PROCEDURE — 3288F PR FALLS RISK ASSESSMENT DOCUMENTED: ICD-10-PCS | Mod: CPTII,S$GLB,,

## 2023-08-14 PROCEDURE — 99214 PR OFFICE/OUTPT VISIT, EST, LEVL IV, 30-39 MIN: ICD-10-PCS | Mod: S$GLB,,,

## 2023-08-14 PROCEDURE — 3061F NEG MICROALBUMINURIA REV: CPT | Mod: CPTII,S$GLB,,

## 2023-08-14 PROCEDURE — 1159F MED LIST DOCD IN RCRD: CPT | Mod: CPTII,S$GLB,,

## 2023-08-14 PROCEDURE — 1160F RVW MEDS BY RX/DR IN RCRD: CPT | Mod: CPTII,S$GLB,,

## 2023-08-14 PROCEDURE — 4010F PR ACE/ARB THEARPY RXD/TAKEN: ICD-10-PCS | Mod: CPTII,S$GLB,,

## 2023-08-14 PROCEDURE — 3079F PR MOST RECENT DIASTOLIC BLOOD PRESSURE 80-89 MM HG: ICD-10-PCS | Mod: CPTII,S$GLB,,

## 2023-08-14 PROCEDURE — 3061F PR NEG MICROALBUMINURIA RESULT DOCUMENTED/REVIEW: ICD-10-PCS | Mod: CPTII,S$GLB,,

## 2023-08-14 PROCEDURE — 4010F ACE/ARB THERAPY RXD/TAKEN: CPT | Mod: CPTII,S$GLB,,

## 2023-08-14 PROCEDURE — 3008F PR BODY MASS INDEX (BMI) DOCUMENTED: ICD-10-PCS | Mod: CPTII,S$GLB,,

## 2023-08-14 PROCEDURE — 3074F SYST BP LT 130 MM HG: CPT | Mod: CPTII,S$GLB,,

## 2023-08-14 PROCEDURE — 3066F PR DOCUMENTATION OF TREATMENT FOR NEPHROPATHY: ICD-10-PCS | Mod: CPTII,S$GLB,,

## 2023-08-14 PROCEDURE — 1159F PR MEDICATION LIST DOCUMENTED IN MEDICAL RECORD: ICD-10-PCS | Mod: CPTII,S$GLB,,

## 2023-08-14 PROCEDURE — 3079F DIAST BP 80-89 MM HG: CPT | Mod: CPTII,S$GLB,,

## 2023-08-14 PROCEDURE — 3074F PR MOST RECENT SYSTOLIC BLOOD PRESSURE < 130 MM HG: ICD-10-PCS | Mod: CPTII,S$GLB,,

## 2023-08-14 PROCEDURE — 1101F PT FALLS ASSESS-DOCD LE1/YR: CPT | Mod: CPTII,S$GLB,,

## 2023-08-14 PROCEDURE — 1160F PR REVIEW ALL MEDS BY PRESCRIBER/CLIN PHARMACIST DOCUMENTED: ICD-10-PCS | Mod: CPTII,S$GLB,,

## 2023-08-14 PROCEDURE — 1101F PR PT FALLS ASSESS DOC 0-1 FALLS W/OUT INJ PAST YR: ICD-10-PCS | Mod: CPTII,S$GLB,,

## 2023-08-14 PROCEDURE — 3288F FALL RISK ASSESSMENT DOCD: CPT | Mod: CPTII,S$GLB,,

## 2023-08-14 PROCEDURE — 99214 OFFICE O/P EST MOD 30 MIN: CPT | Mod: S$GLB,,,

## 2023-08-14 PROCEDURE — 3066F NEPHROPATHY DOC TX: CPT | Mod: CPTII,S$GLB,,

## 2023-08-14 PROCEDURE — 3008F BODY MASS INDEX DOCD: CPT | Mod: CPTII,S$GLB,,

## 2023-08-14 RX ORDER — PRAVASTATIN SODIUM 80 MG/1
80 TABLET ORAL DAILY
Qty: 90 TABLET | Refills: 3 | Status: SHIPPED | OUTPATIENT
Start: 2023-08-14

## 2023-08-14 RX ORDER — AMLODIPINE AND BENAZEPRIL HYDROCHLORIDE 5; 10 MG/1; MG/1
1 CAPSULE ORAL DAILY
Qty: 90 CAPSULE | Refills: 3 | Status: SHIPPED | OUTPATIENT
Start: 2023-08-14

## 2023-08-14 NOTE — LETTER
[address] [facility] [address]  [place] [address]  Phone: [phone]   [phone]                        MD Edil Obregon MD Chequita Williams, MD Matthew Bassett, PA-C Linda Melerine, WILLIE Macdonald, WILLIE Santana, WILLIE      Date: 08/14/2023        Patient: Jose Morataya  YOB: 1950      Please fax a copy of patients recent eye exam.        Sincerely,     Santa Qureshi MA

## 2023-08-14 NOTE — TELEPHONE ENCOUNTER
----- Message from PHYLLIS Ramirez sent at 8/14/2023  9:31 AM CDT -----  Patient states he had eye exam at [facility] last November. Please query.

## 2023-08-14 NOTE — PROGRESS NOTES
SUBJECTIVE:    Patient ID: Jose Morataya is a 73 y.o. male.    Chief Complaint: Hypertension (Did not bring bottles, states he does not need refills // Eye exam- [facility] by [place] (requested)// Pna - refused, states he thinks he already had this somewhere (not in LINKS). ac)    73 year old established male patient here for routine 6 month check up. He has no acute complaints. Sleeps well. Eats well balanced diet. Drinks water. Night shift, works for [employer] for [employer]. Takes vitamins B, C, multivitamin OTC. No bowel complaints. Does void twice a night, but this does not disturb him.     Eye exams with Ivelisse Valerio. Had last in 11/2022.  TETANUS VACCINE is due.  Hepatitis C Screening Completed   Needs Shingrix.  Needs Pneumovax  Hgba1c ordered.  PSA diagnostic ordered due to elevated screening PSA level  Cologuard done in 2021.      Hypertension  Pertinent negatives include no chest pain, headaches, palpitations or shortness of breath.       No visits with results within 6 Month(s) from this visit.   Latest known visit with results is:   Telephone on 02/01/2023   Component Date Value Ref Range Status    WBC 02/03/2023 7.6  3.8 - 10.8 Thousand/uL Final    RBC 02/03/2023 5.41  4.20 - 5.80 Million/uL Final    Hemoglobin 02/03/2023 16.4  13.2 - 17.1 g/dL Final    Hematocrit 02/03/2023 48.1  38.5 - 50.0 % Final    MCV 02/03/2023 88.9  80.0 - 100.0 fL Final    MCH 02/03/2023 30.3  27.0 - 33.0 pg Final    MCHC 02/03/2023 34.1  32.0 - 36.0 g/dL Final    RDW 02/03/2023 13.2  11.0 - 15.0 % Final    Platelets 02/03/2023 216  140 - 400 Thousand/uL Final    MPV 02/03/2023 10.2  7.5 - 12.5 fL Final    Neutrophils, Abs 02/03/2023 4,248  1,500 - 7,800 cells/uL Final    Lymph # 02/03/2023 2,158  850 - 3,900 cells/uL Final    Mono # 02/03/2023 593  200 - 950 cells/uL Final    Eos # 02/03/2023 509 (H)  15 - 500 cells/uL Final    Baso # 02/03/2023 91  0 - 200 cells/uL Final    Neutrophils Relative 02/03/2023 55.9  % Final     Lymph % 02/03/2023 28.4  % Final    Mono % 02/03/2023 7.8  % Final    Eosinophil % 02/03/2023 6.7  % Final    Basophil % 02/03/2023 1.2  % Final    Glucose 02/03/2023 113 (H)  65 - 99 mg/dL Final    BUN 02/03/2023 15  7 - 25 mg/dL Final    Creatinine 02/03/2023 0.83  0.70 - 1.28 mg/dL Final    eGFR 02/03/2023 92  > OR = 60 mL/min/1.73m2 Final    BUN/Creatinine Ratio 02/03/2023 NOT APPLICABLE  6 - 22 (calc) Final    Sodium 02/03/2023 142  135 - 146 mmol/L Final    Potassium 02/03/2023 4.5  3.5 - 5.3 mmol/L Final    Chloride 02/03/2023 107  98 - 110 mmol/L Final    CO2 02/03/2023 29  20 - 32 mmol/L Final    Calcium 02/03/2023 8.8  8.6 - 10.3 mg/dL Final    Total Protein 02/03/2023 6.1  6.1 - 8.1 g/dL Final    Albumin 02/03/2023 3.9  3.6 - 5.1 g/dL Final    Globulin, Total 02/03/2023 2.2  1.9 - 3.7 g/dL (calc) Final    Albumin/Globulin Ratio 02/03/2023 1.8  1.0 - 2.5 (calc) Final    Total Bilirubin 02/03/2023 0.6  0.2 - 1.2 mg/dL Final    Alkaline Phosphatase 02/03/2023 77  35 - 144 U/L Final    AST 02/03/2023 13  10 - 35 U/L Final    ALT 02/03/2023 13  9 - 46 U/L Final    Cholesterol 02/03/2023 137  <200 mg/dL Final    HDL 02/03/2023 39 (L)  > OR = 40 mg/dL Final    Triglycerides 02/03/2023 105  <150 mg/dL Final    LDL Cholesterol 02/03/2023 79  mg/dL (calc) Final    HDL/Cholesterol Ratio 02/03/2023 3.5  <5.0 (calc) Final    Non HDL Chol. (LDL+VLDL) 02/03/2023 98  <130 mg/dL (calc) Final    TSH w/reflex to FT4 02/03/2023 2.90  0.40 - 4.50 mIU/L Final    Color, UA 02/03/2023 YELLOW  YELLOW Final    Appearance, UA 02/03/2023 CLEAR  CLEAR Final    Specific [place], UA 02/03/2023 1.021  1.001 - 1.035 Final    pH, UA 02/03/2023 5.5  5.0 - 8.0 Final    Glucose, UA 02/03/2023 NEGATIVE  NEGATIVE Final    Bilirubin, UA 02/03/2023 NEGATIVE  NEGATIVE Final    Ketones, UA 02/03/2023 NEGATIVE  NEGATIVE Final    Occult Blood UA 02/03/2023 NEGATIVE  NEGATIVE Final    Protein, UA 02/03/2023 NEGATIVE  NEGATIVE Final    Nitrite, UA  02/03/2023 NEGATIVE  NEGATIVE Final    Leukocytes, UA 02/03/2023 NEGATIVE  NEGATIVE Final    WBC Casts, UA 02/03/2023 NONE SEEN  < OR = 5 /HPF Final    RBC Casts, UA 02/03/2023 NONE SEEN  < OR = 2 /HPF Final    Squam Epithel, UA 02/03/2023 NONE SEEN  < OR = 5 /HPF Final    Bacteria, UA 02/03/2023 NONE SEEN  NONE SEEN /HPF Final    Hyaline Casts, UA 02/03/2023 NONE SEEN  NONE SEEN /LPF Final    Service Cmt: 02/03/2023    Final    Reflexive Urine Culture 02/03/2023    Final    PROSTATE SPECIFIC ANTIGEN, SCR - Q* 02/03/2023 3.69  < OR = 4.00 ng/mL Final    Creatinine, Urine 02/03/2023 156  20 - 320 mg/dL Final    Microalb, Ur 02/03/2023 0.6  See Note: mg/dL Final    Microalb/Creat Ratio 02/03/2023 4  <30 mcg/mg creat Final       Past Medical History:   Diagnosis Date    Back pain 2/17/2016    Hyperlipidemia     Hypertension     Myalgia 3/29/2017     Social History     Socioeconomic History    Marital status:    Tobacco Use    Smoking status: Never    Smokeless tobacco: Never   Substance and Sexual Activity    Alcohol use: No    Drug use: No     Social Determinants of Health     Stress: No Stress Concern Present (7/31/2019)    [employer] - Occupational Stress Questionnaire     Feeling of Stress : Only a little     Past Surgical History:   Procedure Laterality Date    TONSILLECTOMY       Family History   Problem Relation Age of Onset    Cancer Mother     No Known Problems Father        Review of patient's allergies indicates:   Allergen Reactions    Amoxicillin        Current Outpatient Medications:     amlodipine-benazepril 5-10 mg (LOTREL) 5-10 mg per capsule, Take 1 capsule by mouth once daily., Disp: 90 capsule, Rfl: 3    pneumoc 20-tamiko conj-dip cr,PF, (PREVNAR-20, PF,) 0.5 mL Syrg injection, Inject 0.5 mLs into the muscle once. for 1 dose, Disp: 0.5 mL, Rfl: 0    pravastatin (PRAVACHOL) 80 MG tablet, Take 1 tablet (80 mg total) by mouth once daily., Disp: 90 tablet, Rfl: 3     "varicella-zoster gE-AS01B, PF, (SHINGRIX) 50 mcg/0.5 mL injection, Inject 0.5 mLs into the muscle once. for 1 dose, Disp: 1 each, Rfl: 0    Review of Systems   Constitutional:  Negative for activity change, appetite change, chills, fatigue, fever and unexpected weight change.   HENT:  Negative for nasal congestion, ear pain, rhinorrhea, sore throat and trouble swallowing.    Eyes:  Negative for pain, discharge and visual disturbance.   Respiratory:  Negative for cough, chest tightness, shortness of breath and wheezing.    Cardiovascular:  Negative for chest pain, palpitations and leg swelling.   Gastrointestinal:  Negative for abdominal pain, blood in stool, constipation, diarrhea, nausea, vomiting and reflux.   Genitourinary:  Negative for bladder incontinence, dysuria, frequency, hematuria and urgency.   Musculoskeletal:  Negative for arthralgias, gait problem, joint swelling and myalgias.   Integumentary:  Negative for rash.   Allergic/Immunologic: Positive for environmental allergies.   Neurological:  Negative for dizziness, tremors, weakness, light-headedness, numbness and headaches.   Hematological:  Does not bruise/bleed easily.   Psychiatric/Behavioral:  Negative for dysphoric mood, sleep disturbance and suicidal ideas. The patient is not nervous/anxious.            Objective:      Vitals:    08/14/23 0859   BP: 126/82   Pulse: 72   Weight: 91.6 kg (202 lb)   Height: 5' 3" (1.6 m)     Physical Exam  Vitals and nursing note reviewed.   Constitutional:       General: He is not in acute distress.     Appearance: Normal appearance. He is well-developed. He is not ill-appearing.   HENT:      Head: Normocephalic and atraumatic.      Right Ear: External ear normal.      Left Ear: External ear normal.      Nose: Nose normal. No congestion or rhinorrhea.      Mouth/Throat:      Mouth: Mucous membranes are moist.      Pharynx: Oropharynx is clear. No oropharyngeal exudate or posterior oropharyngeal erythema.   Eyes: "      General:         Right eye: No discharge.         Left eye: No discharge.      Conjunctiva/sclera: Conjunctivae normal.      Pupils: Pupils are equal, round, and reactive to light.   Neck:      Thyroid: No thyromegaly.      Vascular: No carotid bruit.   Cardiovascular:      Rate and Rhythm: Normal rate and regular rhythm.      Pulses: Normal pulses.      Heart sounds: Normal heart sounds. No murmur heard.  Pulmonary:      Effort: Pulmonary effort is normal.      Breath sounds: Normal breath sounds. No wheezing or rales.   Abdominal:      General: Bowel sounds are normal. There is no distension.      Palpations: Abdomen is soft.      Tenderness: There is no abdominal tenderness.   Musculoskeletal:         General: No tenderness or deformity. Normal range of motion.      Cervical back: Normal range of motion and neck supple.      Lumbar back: Normal. No spasms.      Right lower leg: No edema.      Left lower leg: No edema.   Lymphadenopathy:      Cervical: No cervical adenopathy.   Skin:     General: Skin is warm and dry.      Capillary Refill: Capillary refill takes less than 2 seconds.      Coloration: Skin is not jaundiced.      Findings: No lesion or rash.   Neurological:      General: No focal deficit present.      Mental Status: He is alert and oriented to person, place, and time.      Cranial Nerves: No cranial nerve deficit.      Motor: No weakness.      Coordination: Coordination normal.      Gait: Gait normal.   Psychiatric:         Mood and Affect: Mood normal.         Behavior: Behavior normal.         Thought Content: Thought content normal.         Judgment: Judgment normal.           Assessment:       1. Essential hypertension    2. Mixed hyperlipidemia    3. BPH with obstruction/lower urinary tract symptoms    4. Class 2 severe obesity due to excess calories with serious comorbidity and body mass index (BMI) of 35.0 to 35.9 in adult    5. Elevated PSA, less than 10 ng/ml    6. Impaired fasting  glucose    7. Need for shingles vaccine    8. Need for pneumococcal 20-valent conjugate vaccination         Plan:       Essential hypertension  Well controlled. Patient does have cuff at home. States he would eventually like to see if he can be weaned off BP med. Advised pt he will need to bring his cuff in for calibration. He would like to wait until Dr Durant returns to speak to her about it.  -     amlodipine-benazepril 5-10 mg (LOTREL) 5-10 mg per capsule; Take 1 capsule by mouth once daily.  Dispense: 90 capsule; Refill: 3  -     Comprehensive Metabolic Panel; Future; Expected date: 08/14/2023  -     Hemoglobin A1C; Future; Expected date: 08/14/2023  -     TSH; Future; Expected date: 08/14/2023  -     T4, Free; Future; Expected date: 08/14/2023    Mixed hyperlipidemia  The 10-year CVD risk score (Diana, et al., 2008) is: 26.7%    Values used to calculate the score:      Age: 73 years      Sex: Male      Diabetic: No      Tobacco smoker: No      Systolic Blood Pressure: 126 mmHg      Is BP treated: Yes      HDL Cholesterol: 39 mg/dL      Total Cholesterol: 137 mg/dL   Well controlled. Continue as is.  -     pravastatin (PRAVACHOL) 80 MG tablet; Take 1 tablet (80 mg total) by mouth once daily.  Dispense: 90 tablet; Refill: 3    BPH with obstruction/lower urinary tract symptoms  -     PSA, Total and Free; Future; Expected date: 08/14/2023    Class 2 severe obesity due to excess calories with serious comorbidity and body mass index (BMI) of 35.0 to 35.9 in adult  Discussed healthy lifestyle for overall health and risk reduction    Elevated PSA, less than 10 ng/ml  -     PSA, Total and Free; Future; Expected date: 08/14/2023    Impaired fasting glucose  -     Hemoglobin A1C; Future; Expected date: 08/14/2023    Need for shingles vaccine  -     varicella-zoster gE-AS01B, PF, (SHINGRIX) 50 mcg/0.5 mL injection; Inject 0.5 mLs into the muscle once. for 1 dose  Dispense: 1 each; Refill: 0    Need for pneumococcal  20-valent conjugate vaccination  -     pneumoc 20-tamiko conj-dip cr,PF, (PREVNAR-20, PF,) 0.5 mL Syrg injection; Inject 0.5 mLs into the muscle once. for 1 dose  Dispense: 0.5 mL; Refill: 0      Follow up in about 6 months (around 2/14/2024) for HTN.        8/14/2023 Savannah Santana

## 2023-08-15 NOTE — PROGRESS NOTES
Please let Mr. Garcia know that his labs are ok, glucose a little elevated, but we expected that. Make sure he is drinking lots of water.  Thanks

## 2023-08-16 ENCOUNTER — TELEPHONE (OUTPATIENT)
Dept: FAMILY MEDICINE | Facility: CLINIC | Age: 73
End: 2023-08-16

## 2023-08-16 LAB
ALBUMIN SERPL-MCNC: 4 G/DL (ref 3.6–5.1)
ALBUMIN/GLOB SERPL: 1.8 (CALC) (ref 1–2.5)
ALP SERPL-CCNC: 81 U/L (ref 35–144)
ALT SERPL-CCNC: 22 U/L (ref 9–46)
AST SERPL-CCNC: 18 U/L (ref 10–35)
BILIRUB SERPL-MCNC: 0.5 MG/DL (ref 0.2–1.2)
BUN SERPL-MCNC: 18 MG/DL (ref 7–25)
BUN/CREAT SERPL: 26 (CALC) (ref 6–22)
CALCIUM SERPL-MCNC: 9.1 MG/DL (ref 8.6–10.3)
CHLORIDE SERPL-SCNC: 110 MMOL/L (ref 98–110)
CO2 SERPL-SCNC: 28 MMOL/L (ref 20–32)
CREAT SERPL-MCNC: 0.69 MG/DL (ref 0.7–1.28)
EGFR: 97 ML/MIN/1.73M2
GLOBULIN SER CALC-MCNC: 2.2 G/DL (CALC) (ref 1.9–3.7)
GLUCOSE SERPL-MCNC: 114 MG/DL (ref 65–99)
HBA1C MFR BLD: 6.3 % OF TOTAL HGB
POTASSIUM SERPL-SCNC: 4.6 MMOL/L (ref 3.5–5.3)
PROT SERPL-MCNC: 6.2 G/DL (ref 6.1–8.1)
PSA FREE MFR SERPL: 19 % (CALC)
PSA FREE SERPL-MCNC: 0.8 NG/ML
PSA SERPL-MCNC: 4.2 NG/ML
SODIUM SERPL-SCNC: 144 MMOL/L (ref 135–146)
T4 FREE SERPL-MCNC: 1.1 NG/DL (ref 0.8–1.8)
TSH SERPL-ACNC: 1.97 MIU/L (ref 0.4–4.5)

## 2023-08-16 NOTE — TELEPHONE ENCOUNTER
----- Message from PHYLLIS Ramirez sent at 8/15/2023  8:25 AM CDT -----  Please let Mr. Garcia know that his labs are ok, glucose a little elevated, but we expected that. Make sure he is drinking lots of water.  Thanks

## 2023-08-16 NOTE — TELEPHONE ENCOUNTER
----- Message from PHYLLIS Ramirez sent at 8/16/2023  8:13 AM CDT -----  Please let Mr. Morataya know that his prostate specific antigen is slightly elevated, and has been increasing steadily over the last few results. Because of this, I would like to send him to urology to be evaluated for prostate enlargement. I would like to send him to Dr. Mack Rutherford unless he has a preference otherwise. Thanks.

## 2023-08-18 ENCOUNTER — TELEPHONE (OUTPATIENT)
Dept: FAMILY MEDICINE | Facility: CLINIC | Age: 73
End: 2023-08-18

## 2023-08-21 ENCOUNTER — TELEPHONE (OUTPATIENT)
Dept: FAMILY MEDICINE | Facility: CLINIC | Age: 73
End: 2023-08-21

## 2023-08-21 DIAGNOSIS — N40.1 BPH WITH OBSTRUCTION/LOWER URINARY TRACT SYMPTOMS: Primary | ICD-10-CM

## 2023-08-21 DIAGNOSIS — R97.20 ELEVATED PSA, LESS THAN 10 NG/ML: ICD-10-CM

## 2023-08-21 DIAGNOSIS — N13.8 BPH WITH OBSTRUCTION/LOWER URINARY TRACT SYMPTOMS: Primary | ICD-10-CM

## 2023-08-21 NOTE — TELEPHONE ENCOUNTER
TOMMYOR to call for lab results. Attempting other tel#.     Spoke with wife Emma, gave information verbatim per Savannah, spouse verbalized understanding.  Will send urology referral to Dr. Rutherford

## 2023-08-22 ENCOUNTER — TELEPHONE (OUTPATIENT)
Dept: FAMILY MEDICINE | Facility: CLINIC | Age: 73
End: 2023-08-22
Payer: MEDICARE

## 2024-02-21 ENCOUNTER — OFFICE VISIT (OUTPATIENT)
Dept: FAMILY MEDICINE | Facility: CLINIC | Age: 74
End: 2024-02-21
Attending: FAMILY MEDICINE
Payer: MEDICARE

## 2024-02-21 VITALS
WEIGHT: 208 LBS | HEIGHT: 63 IN | SYSTOLIC BLOOD PRESSURE: 108 MMHG | HEART RATE: 78 BPM | BODY MASS INDEX: 36.86 KG/M2 | OXYGEN SATURATION: 95 % | DIASTOLIC BLOOD PRESSURE: 62 MMHG

## 2024-02-21 DIAGNOSIS — E78.2 MIXED HYPERLIPIDEMIA: ICD-10-CM

## 2024-02-21 DIAGNOSIS — R97.20 ELEVATED PSA, LESS THAN 10 NG/ML: ICD-10-CM

## 2024-02-21 DIAGNOSIS — E66.01 CLASS 2 SEVERE OBESITY DUE TO EXCESS CALORIES WITH SERIOUS COMORBIDITY AND BODY MASS INDEX (BMI) OF 35.0 TO 35.9 IN ADULT: ICD-10-CM

## 2024-02-21 DIAGNOSIS — R73.03 PREDIABETES: ICD-10-CM

## 2024-02-21 DIAGNOSIS — N13.8 BPH WITH OBSTRUCTION/LOWER URINARY TRACT SYMPTOMS: ICD-10-CM

## 2024-02-21 DIAGNOSIS — I10 ESSENTIAL HYPERTENSION: Primary | ICD-10-CM

## 2024-02-21 DIAGNOSIS — N40.1 BPH WITH OBSTRUCTION/LOWER URINARY TRACT SYMPTOMS: ICD-10-CM

## 2024-02-21 PROCEDURE — 99214 OFFICE O/P EST MOD 30 MIN: CPT | Mod: S$GLB,,, | Performed by: FAMILY MEDICINE

## 2024-02-21 RX ORDER — SULFAMETHOXAZOLE AND TRIMETHOPRIM 800; 160 MG/1; MG/1
1 TABLET ORAL 2 TIMES DAILY
Qty: 14 TABLET | Refills: 0 | Status: SHIPPED | OUTPATIENT
Start: 2024-02-21 | End: 2024-02-28

## 2024-02-21 NOTE — PROGRESS NOTES
SUBJECTIVE:    Patient ID: Jose Morataya is a 73 y.o. male.    Chief Complaint: 6M HTN Check Up    Past medical history of hypertension and hyperlipidemia is in for visit.  Has been taking his medications with no problems.  Previous labs have been good.  Noted that patient had elevated fasting blood sugar and A1c performed is consistent with prediabetes.  Unfortunately he has not had much luck and watching his diet and has had some weight gain.      Of concern patient had an elevated PSA.  He reports nocturia 2-3 times a night.  No incontinence.  Reports he has a good flow.  He does have an appointment to see a urologist on next month.    Patient has received flu vaccinations and COVID vaccine.  Reports had shingles vaccination several years ago.  Receive pneumo 20 in August unfortunately it is not showing up in record.  Was received at the local [facility].          No visits with results within 6 Month(s) from this visit.   Latest known visit with results is:   Office Visit on 08/14/2023   Component Date Value Ref Range Status    Glucose 08/14/2023 114 (H)  65 - 99 mg/dL Final    BUN 08/14/2023 18  7 - 25 mg/dL Final    Creatinine 08/14/2023 0.69 (L)  0.70 - 1.28 mg/dL Final    eGFR 08/14/2023 97  > OR = 60 mL/min/1.73m2 Final    BUN/Creatinine Ratio 08/14/2023 26 (H)  6 - 22 (calc) Final    Sodium 08/14/2023 144  135 - 146 mmol/L Final    Potassium 08/14/2023 4.6  3.5 - 5.3 mmol/L Final    Chloride 08/14/2023 110  98 - 110 mmol/L Final    CO2 08/14/2023 28  20 - 32 mmol/L Final    Calcium 08/14/2023 9.1  8.6 - 10.3 mg/dL Final    Total Protein 08/14/2023 6.2  6.1 - 8.1 g/dL Final    Albumin 08/14/2023 4.0  3.6 - 5.1 g/dL Final    Globulin, Total 08/14/2023 2.2  1.9 - 3.7 g/dL (calc) Final    Albumin/Globulin Ratio 08/14/2023 1.8  1.0 - 2.5 (calc) Final    Total Bilirubin 08/14/2023 0.5  0.2 - 1.2 mg/dL Final    Alkaline Phosphatase 08/14/2023 81  35 - 144 U/L Final    AST 08/14/2023 18  10 - 35 U/L Final    ALT  08/14/2023 22  9 - 46 U/L Final    Hemoglobin A1C 08/14/2023 6.3 (H)  <5.7 % of total Hgb Final    TSH 08/14/2023 1.97  0.40 - 4.50 mIU/L Final    T4, Free 08/14/2023 1.1  0.8 - 1.8 ng/dL Final    Total PSA 08/14/2023 4.2 (H)  < OR = 4.0 ng/mL Final    Free PSA 08/14/2023 0.8  ng/mL Final    % Free PSA 08/14/2023 19 (L)  >25 % (calc) Final        Past Medical History:   Diagnosis Date    Back pain 2/17/2016    Hyperlipidemia     Hypertension     Myalgia 3/29/2017     Past Surgical History:   Procedure Laterality Date    TONSILLECTOMY       Family History   Problem Relation Age of Onset    Cancer Mother     No Known Problems Father        Marital Status:   Alcohol History:  reports no history of alcohol use.  Tobacco History:  reports that he has never smoked. He has never used smokeless tobacco.  Drug History:  reports no history of drug use.    Review of patient's allergies indicates:   Allergen Reactions    Amoxicillin        Current Outpatient Medications:     amlodipine-benazepril 5-10 mg (LOTREL) 5-10 mg per capsule, Take 1 capsule by mouth once daily., Disp: 90 capsule, Rfl: 3    pravastatin (PRAVACHOL) 80 MG tablet, Take 1 tablet (80 mg total) by mouth once daily., Disp: 90 tablet, Rfl: 3    sulfamethoxazole-trimethoprim 800-160mg (BACTRIM DS) 800-160 mg Tab, Take 1 tablet by mouth 2 (two) times daily. for 7 days, Disp: 14 tablet, Rfl: 0    Review of Systems   Constitutional:  Negative for activity change and unexpected weight change.   HENT:  Negative for hearing loss, rhinorrhea and trouble swallowing.    Eyes:  Negative for discharge and visual disturbance.   Respiratory:  Negative for chest tightness and wheezing.    Cardiovascular:  Negative for chest pain and palpitations.   Gastrointestinal:  Negative for blood in stool, constipation, diarrhea and vomiting.   Endocrine: Negative for polydipsia and polyuria.   Genitourinary:  Negative for difficulty urinating, dysuria, enuresis, hematuria and  "urgency.   Musculoskeletal:  Negative for arthralgias, joint swelling and neck pain.   Neurological:  Negative for weakness and headaches.   Psychiatric/Behavioral:  Negative for confusion and dysphoric mood.           Objective:      Vitals:    02/21/24 0911   BP: 108/62   Pulse: 78   SpO2: 95%   Weight: 94.3 kg (208 lb)   Height: 5' 3" (1.6 m)     Physical Exam  Vitals reviewed.   Constitutional:       General: He is not in acute distress.     Appearance: He is well-developed. He is obese.   HENT:      Head: Normocephalic and atraumatic.      Right Ear: External ear normal.      Left Ear: External ear normal.      Nose: Nose normal.      Mouth/Throat:      Mouth: Mucous membranes are moist.   Eyes:      Conjunctiva/sclera: Conjunctivae normal.      Pupils: Pupils are equal, round, and reactive to light.   Neck:      Thyroid: No thyromegaly.   Cardiovascular:      Rate and Rhythm: Normal rate and regular rhythm.      Pulses: Normal pulses.      Heart sounds: No murmur heard.  Pulmonary:      Effort: Pulmonary effort is normal.      Breath sounds: Normal breath sounds.   Abdominal:      General: Bowel sounds are normal.      Palpations: Abdomen is soft. There is no mass.      Tenderness: There is no abdominal tenderness.   Musculoskeletal:         General: No tenderness. Normal range of motion.      Cervical back: Normal range of motion and neck supple.   Skin:     General: Skin is warm and dry.      Findings: No rash.   Neurological:      General: No focal deficit present.      Mental Status: He is alert and oriented to person, place, and time.      Cranial Nerves: No cranial nerve deficit.   Psychiatric:         Mood and Affect: Mood normal.         Behavior: Behavior normal.           Assessment:       1. Essential hypertension    2. BPH with obstruction/lower urinary tract symptoms    3. Mixed hyperlipidemia    4. Class 2 severe obesity due to excess calories with serious comorbidity and body mass index (BMI) of " 35.0 to 35.9 in adult    5. Prediabetes    6. Elevated PSA, less than 10 ng/ml         Plan:       Essential hypertension  -     Comprehensive Metabolic Panel; Future; Expected date: 02/21/2024  -     Microalbumin/Creatinine Ratio, Urine; Future; Expected date: 02/21/2024    BPH with obstruction/lower urinary tract symptoms  -     sulfamethoxazole-trimethoprim 800-160mg (BACTRIM DS) 800-160 mg Tab; Take 1 tablet by mouth 2 (two) times daily. for 7 days  Dispense: 14 tablet; Refill: 0    Mixed hyperlipidemia  -     Lipid Panel; Future; Expected date: 02/21/2024    Class 2 severe obesity due to excess calories with serious comorbidity and body mass index (BMI) of 35.0 to 35.9 in adult    Prediabetes  -     Hemoglobin A1C; Future; Expected date: 02/21/2024  -     Microalbumin/Creatinine Ratio, Urine; Future; Expected date: 02/21/2024    Elevated PSA, less than 10 ng/ml  -     PSA, TOTAL AND FREE; Future; Expected date: 02/21/2024      Follow up in about 6 months (around 8/26/2024) for HTN.

## 2024-02-23 ENCOUNTER — TELEPHONE (OUTPATIENT)
Dept: FAMILY MEDICINE | Facility: CLINIC | Age: 74
End: 2024-02-23
Payer: MEDICARE

## 2024-02-23 LAB
ALBUMIN SERPL-MCNC: 3.8 G/DL (ref 3.6–5.1)
ALBUMIN/CREAT UR: 5 MCG/MG CREAT
ALBUMIN/GLOB SERPL: 1.9 (CALC) (ref 1–2.5)
ALP SERPL-CCNC: 68 U/L (ref 35–144)
ALT SERPL-CCNC: 18 U/L (ref 9–46)
AST SERPL-CCNC: 14 U/L (ref 10–35)
BILIRUB SERPL-MCNC: 0.4 MG/DL (ref 0.2–1.2)
BUN SERPL-MCNC: 20 MG/DL (ref 7–25)
BUN/CREAT SERPL: ABNORMAL (CALC) (ref 6–22)
CALCIUM SERPL-MCNC: 8.7 MG/DL (ref 8.6–10.3)
CHLORIDE SERPL-SCNC: 109 MMOL/L (ref 98–110)
CHOLEST SERPL-MCNC: 144 MG/DL
CHOLEST/HDLC SERPL: 3.4 (CALC)
CO2 SERPL-SCNC: 28 MMOL/L (ref 20–32)
CREAT SERPL-MCNC: 0.71 MG/DL (ref 0.7–1.28)
CREAT UR-MCNC: 221 MG/DL (ref 20–320)
EGFR: 96 ML/MIN/1.73M2
GLOBULIN SER CALC-MCNC: 2 G/DL (CALC) (ref 1.9–3.7)
GLUCOSE SERPL-MCNC: 113 MG/DL (ref 65–99)
HBA1C MFR BLD: 6.8 % OF TOTAL HGB
HDLC SERPL-MCNC: 42 MG/DL
LDLC SERPL CALC-MCNC: 85 MG/DL (CALC)
MICROALBUMIN UR-MCNC: 1.2 MG/DL
NONHDLC SERPL-MCNC: 102 MG/DL (CALC)
POTASSIUM SERPL-SCNC: 4.7 MMOL/L (ref 3.5–5.3)
PROT SERPL-MCNC: 5.8 G/DL (ref 6.1–8.1)
PSA FREE MFR SERPL: 19 % (CALC)
PSA FREE SERPL-MCNC: 0.9 NG/ML
PSA SERPL-MCNC: 4.7 NG/ML
SODIUM SERPL-SCNC: 144 MMOL/L (ref 135–146)
TRIGL SERPL-MCNC: 79 MG/DL

## 2024-02-23 NOTE — TELEPHONE ENCOUNTER
----- Message from [facility], RT sent at 2/23/2024  9:47 AM CST -----  PSA=4.7

## 2024-03-11 ENCOUNTER — HOSPITAL ENCOUNTER (EMERGENCY)
Facility: HOSPITAL | Age: 74
Discharge: HOME | End: 2024-03-11
Attending: EMERGENCY MEDICINE
Payer: MEDICARE

## 2024-03-11 VITALS
DIASTOLIC BLOOD PRESSURE: 87 MMHG | TEMPERATURE: 99 F | BODY MASS INDEX: 34.15 KG/M2 | WEIGHT: 200 LBS | RESPIRATION RATE: 18 BRPM | OXYGEN SATURATION: 96 % | HEART RATE: 89 BPM | HEIGHT: 64 IN | SYSTOLIC BLOOD PRESSURE: 134 MMHG

## 2024-03-11 DIAGNOSIS — K52.9 ACUTE COLITIS: ICD-10-CM

## 2024-03-11 DIAGNOSIS — R10.9 ABDOMINAL PAIN, UNSPECIFIED ABDOMINAL LOCATION: Primary | ICD-10-CM

## 2024-03-11 LAB
ALBUMIN SERPL BCP-MCNC: 4 G/DL (ref 3.5–5.2)
ALP SERPL-CCNC: 69 U/L (ref 55–135)
ALT SERPL W/O P-5'-P-CCNC: 17 U/L (ref 10–44)
ANION GAP SERPL CALC-SCNC: 6 MMOL/L (ref 8–16)
AST SERPL-CCNC: 17 U/L (ref 10–40)
BASOPHILS # BLD AUTO: 0.03 K/UL (ref 0–0.2)
BASOPHILS NFR BLD: 0.3 % (ref 0–1.9)
BILIRUB SERPL-MCNC: 0.4 MG/DL (ref 0.1–1)
BILIRUB UR QL STRIP: NEGATIVE
BUN SERPL-MCNC: 21 MG/DL (ref 8–23)
CALCIUM SERPL-MCNC: 9.2 MG/DL (ref 8.7–10.5)
CHLORIDE SERPL-SCNC: 107 MMOL/L (ref 95–110)
CLARITY UR: CLEAR
CO2 SERPL-SCNC: 27 MMOL/L (ref 23–29)
COLOR UR: YELLOW
CREAT SERPL-MCNC: 1 MG/DL (ref 0.5–1.4)
DIFFERENTIAL METHOD BLD: ABNORMAL
EOSINOPHIL # BLD AUTO: 0.2 K/UL (ref 0–0.5)
EOSINOPHIL NFR BLD: 1.5 % (ref 0–8)
ERYTHROCYTE [DISTWIDTH] IN BLOOD BY AUTOMATED COUNT: 13.5 % (ref 11.5–14.5)
EST. GFR  (NO RACE VARIABLE): >60 ML/MIN/1.73 M^2
GLUCOSE SERPL-MCNC: 142 MG/DL (ref 70–110)
GLUCOSE UR QL STRIP: NEGATIVE
HCT VFR BLD AUTO: 46.6 % (ref 40–54)
HGB BLD-MCNC: 15.4 G/DL (ref 14–18)
HGB UR QL STRIP: NEGATIVE
IMM GRANULOCYTES # BLD AUTO: 0.03 K/UL (ref 0–0.04)
IMM GRANULOCYTES NFR BLD AUTO: 0.3 % (ref 0–0.5)
KETONES UR QL STRIP: NEGATIVE
LEUKOCYTE ESTERASE UR QL STRIP: NEGATIVE
LIPASE SERPL-CCNC: 39 U/L (ref 4–60)
LYMPHOCYTES # BLD AUTO: 1.5 K/UL (ref 1–4.8)
LYMPHOCYTES NFR BLD: 15.4 % (ref 18–48)
MCH RBC QN AUTO: 30.7 PG (ref 27–31)
MCHC RBC AUTO-ENTMCNC: 33 G/DL (ref 32–36)
MCV RBC AUTO: 93 FL (ref 82–98)
MONOCYTES # BLD AUTO: 0.7 K/UL (ref 0.3–1)
MONOCYTES NFR BLD: 7.3 % (ref 4–15)
NEUTROPHILS # BLD AUTO: 7.3 K/UL (ref 1.8–7.7)
NEUTROPHILS NFR BLD: 75.2 % (ref 38–73)
NITRITE UR QL STRIP: NEGATIVE
NRBC BLD-RTO: 0 /100 WBC
PH UR STRIP: 5 [PH] (ref 5–8)
PLATELET # BLD AUTO: 197 K/UL (ref 150–450)
PMV BLD AUTO: 9.5 FL (ref 9.2–12.9)
POTASSIUM SERPL-SCNC: 4.8 MMOL/L (ref 3.5–5.1)
PROT SERPL-MCNC: 6.4 G/DL (ref 6–8.4)
PROT UR QL STRIP: NEGATIVE
RBC # BLD AUTO: 5.01 M/UL (ref 4.6–6.2)
SODIUM SERPL-SCNC: 140 MMOL/L (ref 136–145)
SP GR UR STRIP: 1.02 (ref 1–1.03)
URN SPEC COLLECT METH UR: NORMAL
UROBILINOGEN UR STRIP-ACNC: NEGATIVE EU/DL
WBC # BLD AUTO: 9.68 K/UL (ref 3.9–12.7)

## 2024-03-11 PROCEDURE — 25500020 PHARM REV CODE 255: Performed by: EMERGENCY MEDICINE

## 2024-03-11 PROCEDURE — 80053 COMPREHEN METABOLIC PANEL: CPT | Performed by: EMERGENCY MEDICINE

## 2024-03-11 PROCEDURE — 85025 COMPLETE CBC W/AUTO DIFF WBC: CPT | Performed by: EMERGENCY MEDICINE

## 2024-03-11 PROCEDURE — 81003 URINALYSIS AUTO W/O SCOPE: CPT | Performed by: EMERGENCY MEDICINE

## 2024-03-11 PROCEDURE — 83690 ASSAY OF LIPASE: CPT | Performed by: EMERGENCY MEDICINE

## 2024-03-11 PROCEDURE — 99285 EMERGENCY DEPT VISIT HI MDM: CPT | Mod: 25

## 2024-03-11 RX ORDER — CIPROFLOXACIN 500 MG/1
500 TABLET ORAL 2 TIMES DAILY
Qty: 20 TABLET | Refills: 0 | Status: SHIPPED | OUTPATIENT
Start: 2024-03-11 | End: 2024-03-21

## 2024-03-11 RX ORDER — METRONIDAZOLE 500 MG/1
500 TABLET ORAL 3 TIMES DAILY
Qty: 30 TABLET | Refills: 0 | Status: SHIPPED | OUTPATIENT
Start: 2024-03-11 | End: 2024-03-21

## 2024-03-11 RX ADMIN — IOHEXOL 100 ML: 350 INJECTION, SOLUTION INTRAVENOUS at 12:03

## 2024-03-11 NOTE — DISCHARGE INSTRUCTIONS
Return to the ER for worsening pain, fever, or for any other concerns.  Follow up with your PCP and gastroenterology clinic.

## 2024-03-11 NOTE — ED PROVIDER NOTES
"Encounter Date: 3/11/2024       History     Chief Complaint   Patient presents with    Abdominal Pain     Abd pain. Per pt "my stomach has been gurgling for 3-4 days." Had 2 bm with red streaked blood with hard stool, but today was diarrhea.      73-year-old male presenting with complaint diarrhea and abdominal pain.  Patient says that he developed these symptoms early in the morning.  It feels like gas pain.  It is mostly in his lower abdomen.  He says he is having bowel movement and a small amount of bright red blood came out.  He has had 3 subsequent watery brown, nonbloody bowel movements today.  He denies any fever.  No nausea or vomiting.    The history is provided by the patient.     Review of patient's allergies indicates:   Allergen Reactions    Amoxicillin      Past Medical History:   Diagnosis Date    Back pain 2/17/2016    Hyperlipidemia     Hypertension     Myalgia 3/29/2017     Past Surgical History:   Procedure Laterality Date    TONSILLECTOMY       Family History   Problem Relation Age of Onset    Cancer Mother     No Known Problems Father      Social History     Tobacco Use    Smoking status: Never    Smokeless tobacco: Never   Substance Use Topics    Alcohol use: No    Drug use: No     Review of Systems   Gastrointestinal:  Positive for abdominal pain, blood in stool and diarrhea.   All other systems reviewed and are negative.      Physical Exam     Initial Vitals [03/11/24 0917]   BP Pulse Resp Temp SpO2   (!) 148/88 103 18 97.6 °F (36.4 °C) 95 %      MAP       --         Physical Exam    Nursing note and vitals reviewed.  Constitutional: He appears well-developed and well-nourished. He is not diaphoretic. No distress.   HENT:   Head: Normocephalic and atraumatic.   Eyes: Conjunctivae are normal.   Neck: Neck supple.   Normal range of motion.  Cardiovascular:  Normal rate.           Pulmonary/Chest: No respiratory distress.   Abdominal: Abdomen is soft. He exhibits no distension. There is " abdominal tenderness.   Mild LLQ abdominal tenderness There is no rebound and no guarding.   Musculoskeletal:         General: No edema.      Cervical back: Normal range of motion and neck supple.     Neurological: He is alert. He has normal strength.   Skin: Skin is warm and dry. No rash noted. No erythema.   Psychiatric: He has a normal mood and affect.         ED Course   Procedures  Labs Reviewed   CBC W/ AUTO DIFFERENTIAL - Abnormal; Notable for the following components:       Result Value    Gran % 75.2 (*)     Lymph % 15.4 (*)     All other components within normal limits   COMPREHENSIVE METABOLIC PANEL - Abnormal; Notable for the following components:    Glucose 142 (*)     Anion Gap 6 (*)     All other components within normal limits   LIPASE   URINALYSIS, REFLEX TO URINE CULTURE    Narrative:     Specimen Source->Urine          Imaging Results              CT Abdomen Pelvis With IV Contrast NO Oral Contrast (Final result)  Result time 03/11/24 12:52:36      Final result by Salvador Barcenas MD (03/11/24 12:52:36)                   Narrative:    CT ABDOMEN AND PELVIS WITH CONTRAST    HISTORY: Abdominal pain    CMS MANDATED QUALITY DATA - CT RADIATION  436    All CT scans at this facility utilize dose modulation, iterative reconstruction, and/or weight based dosing when appropriate to reduce radiation dose to as low as reasonably achievable    FINDINGS:    Post infusion images were obtained from the lung bases to the pubic symphysis. 100 mL nonionic contrast was administered.    The lung bases are normal in appearance. There is a small hiatal hernia.    The liver has a normal appearance. The gallbladder and biliary tree are unremarkable. The spleen, pancreas, and adrenal glands are normal. Kidneys are normal with the exception of an upper pole simple cyst on the left or the abdominal aorta is calcified but normal in caliber.    There is no pathologic bowel wall thickening or evidence of obstruction. There  is mild fat stranding along the course of the descending colon. Correlate for possible early or mild colitis. Scattered diverticula are noted without diverticulitis.    Images of the pelvis demonstrate small bilateral inguinal hernias containing fat, small amounts of fluid, and short segments of small bowel. Small fat-containing umbilical hernia is also noted. Urinary bladder is within normal limits. The prostate gland is normal in size. There is a small amount of abnormal but nonspecific simple pelvic free fluid.    No acute osseous abnormalities are identified.      IMPRESSION:  1. There is mild fat stranding along the course of the descending colon. Correlate for possible early or mild colitis.  2. Small volume of abnormal but nonspecific pelvic free fluid.  3. Additional findings as above.        Electronically signed by:  Salvador Barcenas MD  03/11/2024 12:52 PM CDT Workstation: [id number]                                     Medications   iohexoL (OMNIPAQUE 350) injection 100 mL (100 mLs Intravenous Given 3/11/24 1220)     Medical Decision Making  Abdominal exam is benign.  He is hemodynamically stable.  H&H is normal.  No leukocytosis.  Normal serum chemistries.  CT abdomen shows findings of mild colitis.  No other acute process.  Stable for discharge.  I will write for course of Cipro and Flagyl and referred to GI clinic.  I discussed return precautions with him.    Amount and/or Complexity of Data Reviewed  Labs: ordered.  Radiology: ordered.    Risk  Prescription drug management.                                      Clinical Impression:  Final diagnoses:  [R10.9] Abdominal pain, unspecified abdominal location (Primary)  [K52.9] Acute colitis          ED Disposition Condition    Discharge Stable          ED Prescriptions       Medication Sig Dispense Start Date End Date Auth. Provider    ciprofloxacin HCl (CIPRO) 500 MG tablet Take 1 tablet (500 mg total) by mouth 2 (two) times daily. for 10 days 20 tablet  3/11/2024 3/21/2024 Ruben Jamse MD    metroNIDAZOLE (FLAGYL) 500 MG tablet Take 1 tablet (500 mg total) by mouth 3 (three) times daily. for 10 days 30 tablet 3/11/2024 3/21/2024 Ruben James MD          Follow-up Information       Follow up With Specialties Details Why Contact Info    Lisa Durant MD [facility]   [address]  [address]  [place] [address]  [phone]      Tobias Palacio MD Gastroenterology   [address]  [facility], [facility] [address]  [phone]               Ruben James MD  03/11/24 6111

## 2024-03-12 ENCOUNTER — TELEPHONE (OUTPATIENT)
Dept: GASTROENTEROLOGY | Facility: CLINIC | Age: 74
End: 2024-03-12
Payer: MEDICARE

## 2024-04-18 ENCOUNTER — TELEPHONE (OUTPATIENT)
Dept: OPHTHALMOLOGY | Facility: CLINIC | Age: 74
End: 2024-04-18
Payer: MEDICARE

## 2024-04-18 NOTE — TELEPHONE ENCOUNTER
Spoke to pt and scheduled urgent appt for floaters and blurred vision.    Pt is out of state til Monday, recommended not waiting til Monday and trying to be seen by provider in [place].       ----- Message from Florence Johnson sent at 4/18/2024  8:59 AM CDT -----  Type: Needs Medical Advice  Who Called:  pt  Symptoms (please be specific):  blurry vision  had accident  How long has patient had these symptoms:  yesterday  Pharmacy name and phone #:    Best Call Back Number: [phone]    Additional Information: pt is in [place]   had some kind of accident and wants to be seen on a certain day.  Please call back to advise.  He asked to speak to Dr. Iqbal

## 2024-04-22 ENCOUNTER — OFFICE VISIT (OUTPATIENT)
Dept: OPHTHALMOLOGY | Facility: CLINIC | Age: 74
End: 2024-04-22
Payer: MEDICARE

## 2024-04-22 DIAGNOSIS — H43.12 VITREOUS HEMORRHAGE, LEFT EYE: Primary | ICD-10-CM

## 2024-04-22 DIAGNOSIS — H25.13 NUCLEAR SCLEROTIC CATARACT, BILATERAL: ICD-10-CM

## 2024-04-22 PROCEDURE — 1159F MED LIST DOCD IN RCRD: CPT | Mod: CPTII,S$GLB,, | Performed by: OPHTHALMOLOGY

## 2024-04-22 PROCEDURE — 1160F RVW MEDS BY RX/DR IN RCRD: CPT | Mod: CPTII,S$GLB,, | Performed by: OPHTHALMOLOGY

## 2024-04-22 PROCEDURE — 1126F AMNT PAIN NOTED NONE PRSNT: CPT | Mod: CPTII,S$GLB,, | Performed by: OPHTHALMOLOGY

## 2024-04-22 PROCEDURE — 3288F FALL RISK ASSESSMENT DOCD: CPT | Mod: CPTII,S$GLB,, | Performed by: OPHTHALMOLOGY

## 2024-04-22 PROCEDURE — 99999 PR PBB SHADOW E&M-EST. PATIENT-LVL II: CPT | Mod: PBBFAC,,, | Performed by: OPHTHALMOLOGY

## 2024-04-22 PROCEDURE — 99204 OFFICE O/P NEW MOD 45 MIN: CPT | Mod: S$GLB,,, | Performed by: OPHTHALMOLOGY

## 2024-04-22 PROCEDURE — 3066F NEPHROPATHY DOC TX: CPT | Mod: CPTII,S$GLB,, | Performed by: OPHTHALMOLOGY

## 2024-04-22 PROCEDURE — 1101F PT FALLS ASSESS-DOCD LE1/YR: CPT | Mod: CPTII,S$GLB,, | Performed by: OPHTHALMOLOGY

## 2024-04-22 PROCEDURE — 4010F ACE/ARB THERAPY RXD/TAKEN: CPT | Mod: CPTII,S$GLB,, | Performed by: OPHTHALMOLOGY

## 2024-04-22 PROCEDURE — 3061F NEG MICROALBUMINURIA REV: CPT | Mod: CPTII,S$GLB,, | Performed by: OPHTHALMOLOGY

## 2024-04-22 PROCEDURE — 3044F HG A1C LEVEL LT 7.0%: CPT | Mod: CPTII,S$GLB,, | Performed by: OPHTHALMOLOGY

## 2024-04-22 NOTE — PROGRESS NOTES
HPI    Pt presents due to follow up due to blood OS    States when out of town last week he noticed lots of floaters in OS after   bending over. States was seen out of town and was told no RD, but states   vision is still very blurry. States was told there is blood in the back of   the eye. States he is not a diabetic.     No eye meds  NO eye sx hx   Last edited by Susan Baker on 4/22/2024  1:43 PM.            Assessment /Plan     For exam results, see Encounter Report.    Vitreous hemorrhage, left eye    Nuclear sclerotic cataract, bilateral      1. Vitreous hemorrhage, left eye  S/p eval with retina specialist in [place] last week, u/s reportedly no detachment  Peripheral retina fair view, no RD or tear appreciable    VH precautions reviewed  Sleep with HOB elevated, avoid blood thinners, avoid straining    Rec retina eval ~6 weeks    2. Nuclear sclerotic cataract, bilateral  Mild, observe

## 2024-06-05 ENCOUNTER — OFFICE VISIT (OUTPATIENT)
Dept: OPHTHALMOLOGY | Facility: CLINIC | Age: 74
End: 2024-06-05
Payer: MEDICARE

## 2024-06-05 DIAGNOSIS — H25.13 AGE-RELATED NUCLEAR CATARACT OF BOTH EYES: ICD-10-CM

## 2024-06-05 DIAGNOSIS — H43.12 VITREOUS HEMORRHAGE, LEFT EYE: Primary | ICD-10-CM

## 2024-06-05 DIAGNOSIS — H35.033 HYPERTENSIVE RETINOPATHY OF BOTH EYES: ICD-10-CM

## 2024-06-05 DIAGNOSIS — H43.813 POSTERIOR VITREOUS DETACHMENT OF BOTH EYES: ICD-10-CM

## 2024-06-05 LAB
LEFT EYE DM RETINOPATHY: NEGATIVE
RIGHT EYE DM RETINOPATHY: NEGATIVE

## 2024-06-05 PROCEDURE — 3288F FALL RISK ASSESSMENT DOCD: CPT | Mod: CPTII,S$GLB,, | Performed by: OPHTHALMOLOGY

## 2024-06-05 PROCEDURE — 1159F MED LIST DOCD IN RCRD: CPT | Mod: CPTII,S$GLB,, | Performed by: OPHTHALMOLOGY

## 2024-06-05 PROCEDURE — 3061F NEG MICROALBUMINURIA REV: CPT | Mod: CPTII,S$GLB,, | Performed by: OPHTHALMOLOGY

## 2024-06-05 PROCEDURE — 1160F RVW MEDS BY RX/DR IN RCRD: CPT | Mod: CPTII,S$GLB,, | Performed by: OPHTHALMOLOGY

## 2024-06-05 PROCEDURE — 99214 OFFICE O/P EST MOD 30 MIN: CPT | Mod: S$GLB,,, | Performed by: OPHTHALMOLOGY

## 2024-06-05 PROCEDURE — 92134 CPTRZ OPH DX IMG PST SGM RTA: CPT | Mod: S$GLB,,, | Performed by: OPHTHALMOLOGY

## 2024-06-05 PROCEDURE — 92201 OPSCPY EXTND RTA DRAW UNI/BI: CPT | Mod: S$GLB,,, | Performed by: OPHTHALMOLOGY

## 2024-06-05 PROCEDURE — 99999 PR PBB SHADOW E&M-EST. PATIENT-LVL II: CPT | Mod: PBBFAC,,, | Performed by: OPHTHALMOLOGY

## 2024-06-05 PROCEDURE — 3066F NEPHROPATHY DOC TX: CPT | Mod: CPTII,S$GLB,, | Performed by: OPHTHALMOLOGY

## 2024-06-05 PROCEDURE — 1101F PT FALLS ASSESS-DOCD LE1/YR: CPT | Mod: CPTII,S$GLB,, | Performed by: OPHTHALMOLOGY

## 2024-06-05 PROCEDURE — 4010F ACE/ARB THERAPY RXD/TAKEN: CPT | Mod: CPTII,S$GLB,, | Performed by: OPHTHALMOLOGY

## 2024-06-05 PROCEDURE — 3044F HG A1C LEVEL LT 7.0%: CPT | Mod: CPTII,S$GLB,, | Performed by: OPHTHALMOLOGY

## 2024-06-05 PROCEDURE — 1126F AMNT PAIN NOTED NONE PRSNT: CPT | Mod: CPTII,S$GLB,, | Performed by: OPHTHALMOLOGY

## 2024-06-05 NOTE — PROGRESS NOTES
HPI    Pt presents for retina eval, per Dr Iqbal     States no ocular complaints. States thinks there is still a little blood   in the back of the eye     No eye meds    Last edited by Susan Baker on 6/5/2024 10:17 AM.         A/P    ICD-10-CM ICD-9-CM   1. Vitreous hemorrhage, left eye  H43.12 379.23   2. Posterior vitreous detachment of both eyes  H43.813 379.21   3. Age-related nuclear cataract of both eyes  H25.13 366.16   4. Hypertensive retinopathy of both eyes  H35.033 362.11       1. Vitreous hemorrhage, left eye  2. Posterior vitreous detachment of both eyes  Referral from Dr Iqbal for retina check - Recent notes reviewed    Hx of hemorrhagic PVD few mo ago, pt feels heme OS has gotten better    Exam notable for PVD OU, has inf heme OS still but clearing, no RT/RD with   Plan: Observation for now, counseled on nature of how PVD can cause hemorrhage and risk of RT/RD, monitor for now, recheck in 6 mo     Pathology of PVD, Retinal Tear, Retinal Detachment reviewed in great detail  RD precautions discussed in detail, patient expressed understanding  RTC immediately PRN (especially ANY change flashes, floaters, vision, visual field)     3. Age-related nuclear cataract of both eyes  Mild NS, NVS  Plan: Observation Update Mrx prn     4. Hypertensive retinopathy of both eyes  PCP Lisa Durant MD - Recent notes reviewed  02/21/2024  6.8  A1C   Mild HTN changes  Plan: Observation  Recommend good blood pressure control, tight blood glucose control, and good cholesterol control     RTC 6 mo DFE/OCTm OU         I saw and examined the patient and reviewed in detail the findings documented. The final examination findings, image interpretations which have been independently interpreted, and plan as documented in the record represent my personal judgment and conclusions.    Juan Sam MD  Vitreoretinal Surgery   [facility]

## 2024-06-25 DIAGNOSIS — Z00.00 ENCOUNTER FOR MEDICARE ANNUAL WELLNESS EXAM: ICD-10-CM

## 2024-07-01 NOTE — PROGRESS NOTES
Contacted the patient to schedule an endoscopy procedure(s) Colonoscopy. The patient did not answer the call and left a voice message requesting a call back.      Please let Mr. Morataya know that his prostate specific antigen is slightly elevated, and has been increasing steadily over the last few results. Because of this, I would like to send him to urology to be evaluated for prostate enlargement. I would like to send him to Dr. Mack Rutherford unless he has a preference otherwise. Thanks.

## 2024-08-06 ENCOUNTER — TELEPHONE (OUTPATIENT)
Dept: FAMILY MEDICINE | Facility: CLINIC | Age: 74
End: 2024-08-06
Payer: MEDICARE

## 2024-08-06 DIAGNOSIS — R73.03 PREDIABETES: ICD-10-CM

## 2024-08-06 DIAGNOSIS — E66.01 CLASS 2 SEVERE OBESITY DUE TO EXCESS CALORIES WITH SERIOUS COMORBIDITY AND BODY MASS INDEX (BMI) OF 35.0 TO 35.9 IN ADULT: Primary | ICD-10-CM

## 2024-08-21 ENCOUNTER — OFFICE VISIT (OUTPATIENT)
Dept: FAMILY MEDICINE | Facility: CLINIC | Age: 74
End: 2024-08-21
Payer: MEDICARE

## 2024-08-21 ENCOUNTER — PATIENT OUTREACH (OUTPATIENT)
Dept: ADMINISTRATIVE | Facility: HOSPITAL | Age: 74
End: 2024-08-21
Payer: MEDICARE

## 2024-08-21 VITALS
SYSTOLIC BLOOD PRESSURE: 110 MMHG | HEART RATE: 90 BPM | DIASTOLIC BLOOD PRESSURE: 68 MMHG | BODY MASS INDEX: 33.97 KG/M2 | HEIGHT: 64 IN | WEIGHT: 199 LBS | OXYGEN SATURATION: 95 %

## 2024-08-21 DIAGNOSIS — R73.03 PREDIABETES: ICD-10-CM

## 2024-08-21 DIAGNOSIS — K21.9 GASTROESOPHAGEAL REFLUX DISEASE WITHOUT ESOPHAGITIS: Primary | ICD-10-CM

## 2024-08-21 DIAGNOSIS — I10 ESSENTIAL HYPERTENSION: ICD-10-CM

## 2024-08-21 DIAGNOSIS — E78.2 MIXED HYPERLIPIDEMIA: ICD-10-CM

## 2024-08-21 DIAGNOSIS — K44.9 HIATAL HERNIA: ICD-10-CM

## 2024-08-21 DIAGNOSIS — Z29.11 NEED FOR RSV VACCINATION: ICD-10-CM

## 2024-08-21 DIAGNOSIS — H35.033 HYPERTENSIVE RETINOPATHY OF BOTH EYES: ICD-10-CM

## 2024-08-21 LAB — HBA1C MFR BLD: 5.8 %

## 2024-08-21 PROCEDURE — 3074F SYST BP LT 130 MM HG: CPT | Mod: CPTII,S$GLB,, | Performed by: FAMILY MEDICINE

## 2024-08-21 PROCEDURE — 4010F ACE/ARB THERAPY RXD/TAKEN: CPT | Mod: CPTII,S$GLB,, | Performed by: FAMILY MEDICINE

## 2024-08-21 PROCEDURE — 3044F HG A1C LEVEL LT 7.0%: CPT | Mod: CPTII,S$GLB,, | Performed by: FAMILY MEDICINE

## 2024-08-21 PROCEDURE — 1101F PT FALLS ASSESS-DOCD LE1/YR: CPT | Mod: CPTII,S$GLB,, | Performed by: FAMILY MEDICINE

## 2024-08-21 PROCEDURE — 3078F DIAST BP <80 MM HG: CPT | Mod: CPTII,S$GLB,, | Performed by: FAMILY MEDICINE

## 2024-08-21 PROCEDURE — 3288F FALL RISK ASSESSMENT DOCD: CPT | Mod: CPTII,S$GLB,, | Performed by: FAMILY MEDICINE

## 2024-08-21 PROCEDURE — 1159F MED LIST DOCD IN RCRD: CPT | Mod: CPTII,S$GLB,, | Performed by: FAMILY MEDICINE

## 2024-08-21 PROCEDURE — 99214 OFFICE O/P EST MOD 30 MIN: CPT | Mod: S$GLB,,, | Performed by: FAMILY MEDICINE

## 2024-08-21 PROCEDURE — 83036 HEMOGLOBIN GLYCOSYLATED A1C: CPT | Mod: QW,,, | Performed by: FAMILY MEDICINE

## 2024-08-21 PROCEDURE — 3066F NEPHROPATHY DOC TX: CPT | Mod: CPTII,S$GLB,, | Performed by: FAMILY MEDICINE

## 2024-08-21 PROCEDURE — 3061F NEG MICROALBUMINURIA REV: CPT | Mod: CPTII,S$GLB,, | Performed by: FAMILY MEDICINE

## 2024-08-21 PROCEDURE — 1160F RVW MEDS BY RX/DR IN RCRD: CPT | Mod: CPTII,S$GLB,, | Performed by: FAMILY MEDICINE

## 2024-08-21 RX ORDER — RESPIRATORY SYNCYTIAL VISUS VACCINE RECOMBINANT, ADJUVANTED 120MCG/0.5
0.5 KIT INTRAMUSCULAR ONCE
Qty: 0.5 ML | Refills: 0 | Status: SHIPPED | OUTPATIENT
Start: 2024-08-21 | End: 2024-08-21

## 2024-08-21 RX ORDER — AMLODIPINE AND BENAZEPRIL HYDROCHLORIDE 5; 10 MG/1; MG/1
1 CAPSULE ORAL DAILY
Qty: 90 CAPSULE | Refills: 3 | Status: SHIPPED | OUTPATIENT
Start: 2024-08-21

## 2024-08-21 RX ORDER — TADALAFIL 20 MG/1
20 TABLET ORAL EVERY OTHER DAY
COMMUNITY
Start: 2024-02-02

## 2024-08-21 RX ORDER — PRAVASTATIN SODIUM 80 MG/1
80 TABLET ORAL DAILY
Qty: 90 TABLET | Refills: 3 | Status: SHIPPED | OUTPATIENT
Start: 2024-08-21

## 2024-08-21 NOTE — PROGRESS NOTES
Population Health Chart Review & Patient Outreach Details      Additional [facility] Health Notes:               Updates Requested / Reviewed:      Updated Care Coordination Note, Care Everywhere, , Care Team Updated, and Immunizations Reconciliation Completed or Queried: [facility] Topics Overdue:      [facility] Score: 1     Colon Cancer Screening    Pneumonia Vaccine  Tetanus Vaccine  Shingles/Zoster Vaccine  RSV Vaccine                  Health Maintenance Topic(s) Outreach Outcomes & Actions Taken:    Eye Exam - Outreach Outcomes & Actions Taken  : Diabetic Eye External Records Uploaded, Care Team & History Updated if Applicable

## 2024-08-21 NOTE — PROGRESS NOTES
SUBJECTIVE:   HPI: Jose Morataya  is a 74 y.o. male who presents for regular visit   6M HTN Check Up (-Eye Exam In System/-Cologuard Order/-Pneumonia Vaccine Due/Available/) and Intermittent Indigestion-Like Feeling    Patient with controlled hypertension and hyperlipidemia is in for visit.  No issues with his medications.  In March had an episode of colitis and treated with Cipro and Flagyl in the ER.  Has been having flare-up of acid reflux since that time.  Gets a burning in his chest intermittently when he lies down at night.  Sometimes has trouble with foods.  Sometimes food makes her feel better.  He does use Tums.  CT showed small hiatal hernia he has never had endoscopy.    Colon cancer screening he has had Cologuard and it has been negative.  Colon cancer screening is due once again.     Following with urology for h/o elevated PSA diagnosed BPH  and has started Cialis which has helped with his symptoms    Diagnosed prediabetes up-to-date with eye exam.  Prediabetes  In June had retinal check and tested for diabetic retinopathy which was negative.  Mild cataract and hypertensive retinopathy noted      Office Visit on 08/21/2024   Component Date Value Ref Range Status    Hemoglobin A1C, POC 08/21/2024 5.8  % Final   Admission on 03/11/2024, Discharged on 03/11/2024   Component Date Value Ref Range Status    WBC 03/11/2024 9.68  3.90 - 12.70 K/uL Final    RBC 03/11/2024 5.01  4.60 - 6.20 M/uL Final    Hemoglobin 03/11/2024 15.4  14.0 - 18.0 g/dL Final    Hematocrit 03/11/2024 46.6  40.0 - 54.0 % Final    MCV 03/11/2024 93  82 - 98 fL Final    MCH 03/11/2024 30.7  27.0 - 31.0 pg Final    MCHC 03/11/2024 33.0  32.0 - 36.0 g/dL Final    RDW 03/11/2024 13.5  11.5 - 14.5 % Final    Platelets 03/11/2024 197  150 - 450 K/uL Final    MPV 03/11/2024 9.5  9.2 - 12.9 fL Final    Immature Granulocytes 03/11/2024 0.3  0.0 - 0.5 % Final    Gran # (ANC) 03/11/2024 7.3  1.8 - 7.7 K/uL Final    Immature Grans (Abs)  03/11/2024 0.03  0.00 - 0.04 K/uL Final    Lymph # 03/11/2024 1.5  1.0 - 4.8 K/uL Final    Mono # 03/11/2024 0.7  0.3 - 1.0 K/uL Final    Eos # 03/11/2024 0.2  0.0 - 0.5 K/uL Final    Baso # 03/11/2024 0.03  0.00 - 0.20 K/uL Final    nRBC 03/11/2024 0  0 /100 WBC Final    Gran % 03/11/2024 75.2 (H)  38.0 - 73.0 % Final    Lymph % 03/11/2024 15.4 (L)  18.0 - 48.0 % Final    Mono % 03/11/2024 7.3  4.0 - 15.0 % Final    Eosinophil % 03/11/2024 1.5  0.0 - 8.0 % Final    Basophil % 03/11/2024 0.3  0.0 - 1.9 % Final    Differential Method 03/11/2024 Automated   Final    Sodium 03/11/2024 140  136 - 145 mmol/L Final    Potassium 03/11/2024 4.8  3.5 - 5.1 mmol/L Final    Chloride 03/11/2024 107  95 - 110 mmol/L Final    CO2 03/11/2024 27  23 - 29 mmol/L Final    Glucose 03/11/2024 142 (H)  70 - 110 mg/dL Final    BUN 03/11/2024 21  8 - 23 mg/dL Final    Creatinine 03/11/2024 1.0  0.5 - 1.4 mg/dL Final    Calcium 03/11/2024 9.2  8.7 - 10.5 mg/dL Final    Total Protein 03/11/2024 6.4  6.0 - 8.4 g/dL Final    Albumin 03/11/2024 4.0  3.5 - 5.2 g/dL Final    Total Bilirubin 03/11/2024 0.4  0.1 - 1.0 mg/dL Final    Alkaline Phosphatase 03/11/2024 69  55 - 135 U/L Final    AST 03/11/2024 17  10 - 40 U/L Final    ALT 03/11/2024 17  10 - 44 U/L Final    eGFR 03/11/2024 >60.0  >60 mL/min/1.73 m^2 Final    Anion Gap 03/11/2024 6 (L)  8 - 16 mmol/L Final    Lipase 03/11/2024 39  4 - 60 U/L Final    Specimen UA 03/11/2024 Urine, Clean Catch   Final    Color, UA 03/11/2024 Yellow  Yellow, Straw, Nazia Final    Appearance, UA 03/11/2024 Clear  Clear Final    pH, UA 03/11/2024 5.0  5.0 - 8.0 Final    Specific Gravity, UA 03/11/2024 1.025  1.005 - 1.030 Final    Protein, UA 03/11/2024 Negative  Negative Final    Glucose, UA 03/11/2024 Negative  Negative Final    Ketones, UA 03/11/2024 Negative  Negative Final    Bilirubin (UA) 03/11/2024 Negative  Negative Final    Occult Blood UA 03/11/2024 Negative  Negative Final    Nitrite, UA  03/11/2024 Negative  Negative Final    Urobilinogen, UA 03/11/2024 Negative  Negative EU/dL Final    Leukocytes, UA 03/11/2024 Negative  Negative Final      (Not in a hospital admission)    Review of patient's allergies indicates:   Allergen Reactions    Amoxicillin      Current Outpatient Medications on File Prior to Visit   Medication Sig Dispense Refill    tadalafiL (CIALIS) 20 MG Tab Take 20 mg by mouth every other day.      [DISCONTINUED] amlodipine-benazepril 5-10 mg (LOTREL) 5-10 mg per capsule Take 1 capsule by mouth once daily. 90 capsule 3    [DISCONTINUED] pravastatin (PRAVACHOL) 80 MG tablet Take 1 tablet (80 mg total) by mouth once daily. 90 tablet 3     No current facility-administered medications on file prior to visit.     Past Medical History:   Diagnosis Date    Back pain 2/17/2016    Hyperlipidemia     Hypertension     Myalgia 3/29/2017     Past Surgical History:   Procedure Laterality Date    TONSILLECTOMY       Family History   Problem Relation Name Age of Onset    Cancer Mother      No Known Problems Father       Social History     Tobacco Use    Smoking status: Never    Smokeless tobacco: Never   Substance Use Topics    Alcohol use: No    Drug use: No      Health Maintenance Topics with due status: Not Due       Topic Last Completion Date    Influenza Vaccine 09/07/2023    Lipid Panel 02/21/2024    Hemoglobin A1c (Prediabetes) 08/21/2024     Immunization History   Administered Date(s) Administered    COVID-19, MRNA, LN-S, PF (Pfizer) (Gray Cap) 08/15/2022    COVID-19, MRNA, LN-S, PF (Pfizer) (Purple Cap) 01/15/2021, 02/05/2021, 10/27/2021    Influenza 10/04/2019, 09/19/2020, 09/09/2022    Influenza - High Dose - PF (65 years and older) 10/30/2016, 09/24/2017    Influenza - Quadrivalent - High Dose - PF (65 years and older) 09/06/2020, 09/22/2021, 09/22/2022, 09/07/2023    Influenza A (H1N1) 2009 Monovalent - IM 01/22/2010       Review of Systems   Constitutional:  Negative for activity change  "and unexpected weight change.   HENT:  Negative for hearing loss, rhinorrhea and trouble swallowing.    Eyes:  Negative for discharge and visual disturbance.   Respiratory:  Negative for chest tightness and wheezing.    Cardiovascular:  Negative for chest pain and palpitations.   Gastrointestinal:  Negative for blood in stool, constipation, diarrhea and vomiting.   Endocrine: Negative for polydipsia and polyuria.   Genitourinary:  Negative for difficulty urinating, hematuria and urgency.   Musculoskeletal:  Negative for arthralgias, joint swelling and neck pain.   Neurological:  Negative for weakness and headaches.   Psychiatric/Behavioral:  Negative for confusion and dysphoric mood.       OBJECTIVE:          6/5/2024    10:17 AM 8/21/2024     9:21 AM   Vitals - 1 value per visit   SYSTOLIC  110   DIASTOLIC  68   Pulse  90   SPO2  95 %   Weight (lb)  199   Weight (kg)  90.266   Height  5' 4" (1.626 m)   BMI (Calculated)  34.1   Pain Score Zero       Physical Exam  Constitutional:       Appearance: Normal appearance.   HENT:      Head: Normocephalic and atraumatic.      Mouth/Throat:      Mouth: Mucous membranes are moist.   Eyes:      Conjunctiva/sclera: Conjunctivae normal.   Cardiovascular:      Rate and Rhythm: Normal rate.   Pulmonary:      Effort: Pulmonary effort is normal.   Abdominal:      Palpations: Abdomen is soft.      Tenderness: There is no abdominal tenderness.   Neurological:      General: No focal deficit present.      Mental Status: He is alert and oriented to person, place, and time.   Psychiatric:         Mood and Affect: Mood normal.         Behavior: Behavior normal.        Assessment:       1. Gastroesophageal reflux disease without esophagitis    2. Hiatal hernia    3. Mixed hyperlipidemia    4. Essential hypertension    5. Prediabetes    6. Need for RSV vaccination    7. Hypertensive retinopathy of both eyes        Plan:       Gastroesophageal reflux disease without esophagitis  -     " Ambulatory referral/consult to Gastroenterology; Future; Expected date: 08/28/2024    Hiatal hernia  Comments:  hannahld be worsening his gerd symptoms    Mixed hyperlipidemia  -     pravastatin (PRAVACHOL) 80 MG tablet; Take 1 tablet (80 mg total) by mouth once daily.  Dispense: 90 tablet; Refill: 3  -     Comprehensive Metabolic Panel; Future; Expected date: 02/15/2025  -     Lipid Panel; Future; Expected date: 02/15/2025    Essential hypertension  -     amlodipine-benazepril 5-10 mg (LOTREL) 5-10 mg per capsule; Take 1 capsule by mouth once daily.  Dispense: 90 capsule; Refill: 3  -     CBC Auto Differential; Future; Expected date: 02/15/2025  -     Comprehensive Metabolic Panel; Future; Expected date: 02/15/2025  -     Microalbumin/Creatinine Ratio, Urine; Future; Expected date: 02/15/2025    Prediabetes  Comments:  improving  Orders:  -     POCT HEMOGLOBIN A1C  -     Hemoglobin A1C; Future; Expected date: 02/15/2025    Need for RSV vaccination  -     RSVPreF3 antigen-AS01E, PF, (AREXVY, PF,) 120 mcg/0.5 mL SusR vaccine; Inject 0.5 mLs into the muscle once. for 1 dose  Dispense: 0.5 mL; Refill: 0    Hypertensive retinopathy of both eyes      Medication List with Changes/Refills   New Medications    RSVPREF3 ANTIGEN-AS01E, PF, (AREXVY, PF,) 120 MCG/0.5 ML SUSR VACCINE    Inject 0.5 mLs into the muscle once. for 1 dose   Current Medications    TADALAFIL (CIALIS) 20 MG TAB    Take 20 mg by mouth every other day.   Changed and/or Refilled Medications    Modified Medication Previous Medication    AMLODIPINE-BENAZEPRIL 5-10 MG (LOTREL) 5-10 MG PER CAPSULE amlodipine-benazepril 5-10 mg (LOTREL) 5-10 mg per capsule       Take 1 capsule by mouth once daily.    Take 1 capsule by mouth once daily.    PRAVASTATIN (PRAVACHOL) 80 MG TABLET pravastatin (PRAVACHOL) 80 MG tablet       Take 1 tablet (80 mg total) by mouth once daily.    Take 1 tablet (80 mg total) by mouth once daily.        Counseled on age and gender appropriate  medical preventative services, including cancer screenings, immunizations, overall nutritional health, need for a consistent exercise regimen and an overall push towards maintaining a vigorous and active lifestyle.      Follow up in about 6 months (around 2/21/2025) for HTN.

## 2024-09-30 ENCOUNTER — OCCUPATIONAL HEALTH (OUTPATIENT)
Dept: URGENT CARE | Facility: CLINIC | Age: 74
End: 2024-09-30

## 2024-09-30 PROCEDURE — 82075 ASSAY OF BREATH ETHANOL: CPT | Mod: S$GLB,,, | Performed by: EMERGENCY MEDICINE

## 2024-09-30 PROCEDURE — 80305 DRUG TEST PRSMV DIR OPT OBS: CPT | Mod: S$GLB,,, | Performed by: EMERGENCY MEDICINE

## 2024-10-22 LAB — CRC RECOMMENDATION EXT: NORMAL

## 2024-10-24 ENCOUNTER — PATIENT OUTREACH (OUTPATIENT)
Dept: ADMINISTRATIVE | Facility: HOSPITAL | Age: 74
End: 2024-10-24
Payer: MEDICARE

## 2024-10-24 NOTE — PROGRESS NOTES
Population Health Chart Review & Patient Outreach Details      Additional [facility] Health Notes:               Updates Requested / Reviewed:      Updated Care Coordination Note, Care Everywhere, , Care Team Updated, and Immunizations Reconciliation Completed or Queried: [facility] Topics Overdue:      [facility] Score: 0     Patient is not due for any topics at this time.    Pneumonia Vaccine  Tetanus Vaccine  Shingles/Zoster Vaccine                  Health Maintenance Topic(s) Outreach Outcomes & Actions Taken:    Colorectal Cancer Screening - Outreach Outcomes & Actions Taken  : External Records Uploaded, Care Team Updated, & History Updated if Applicable

## 2024-12-05 ENCOUNTER — PATIENT MESSAGE (OUTPATIENT)
Dept: FAMILY MEDICINE | Facility: CLINIC | Age: 74
End: 2024-12-05
Payer: MEDICARE

## 2025-01-14 DIAGNOSIS — Z00.00 ENCOUNTER FOR MEDICARE ANNUAL WELLNESS EXAM: ICD-10-CM

## 2025-02-19 ENCOUNTER — OFFICE VISIT (OUTPATIENT)
Dept: FAMILY MEDICINE | Facility: CLINIC | Age: 75
End: 2025-02-19
Payer: MEDICARE

## 2025-02-19 VITALS
RESPIRATION RATE: 18 BRPM | SYSTOLIC BLOOD PRESSURE: 112 MMHG | BODY MASS INDEX: 33.46 KG/M2 | OXYGEN SATURATION: 96 % | HEIGHT: 64 IN | HEART RATE: 91 BPM | DIASTOLIC BLOOD PRESSURE: 60 MMHG | WEIGHT: 196 LBS

## 2025-02-19 DIAGNOSIS — N40.1 BPH WITH OBSTRUCTION/LOWER URINARY TRACT SYMPTOMS: ICD-10-CM

## 2025-02-19 DIAGNOSIS — E66.01 MORBID OBESITY: ICD-10-CM

## 2025-02-19 DIAGNOSIS — H35.033 HYPERTENSIVE RETINOPATHY OF BOTH EYES: ICD-10-CM

## 2025-02-19 DIAGNOSIS — I10 ESSENTIAL HYPERTENSION: Primary | ICD-10-CM

## 2025-02-19 DIAGNOSIS — N13.8 BPH WITH OBSTRUCTION/LOWER URINARY TRACT SYMPTOMS: ICD-10-CM

## 2025-02-19 DIAGNOSIS — E78.2 MIXED HYPERLIPIDEMIA: ICD-10-CM

## 2025-02-19 DIAGNOSIS — R73.03 PREDIABETES: ICD-10-CM

## 2025-02-19 PROBLEM — E66.812 CLASS 2 SEVERE OBESITY DUE TO EXCESS CALORIES WITH SERIOUS COMORBIDITY AND BODY MASS INDEX (BMI) OF 35.0 TO 35.9 IN ADULT: Status: RESOLVED | Noted: 2022-08-08 | Resolved: 2025-02-19

## 2025-02-19 PROBLEM — H43.12 VITREOUS HEMORRHAGE, LEFT EYE: Status: RESOLVED | Noted: 2024-06-05 | Resolved: 2025-02-19

## 2025-02-19 NOTE — PROGRESS NOTES
SUBJECTIVE:    Patient ID: Jose Morataya is a 74 y.o. male.    Chief Complaint: Follow-up (No bottles// no refills needed// pt states he have no complaints today //pt refused eye exam )    History of Present Illness    CHIEF COMPLAINT:  Patient presents today for follow up of hypertension and hyperlipidemia.    MEDICATIONS:  He takes pravastatin and amlodipine benazepril without any difficulties.    CARDIOVASCULAR:  He has hypertension and hyperlipidemia. He experiences intermittent muscle pains.    MEDICAL HISTORY:  He has a history of diverticulitis, resolved hemorrhage in the back of the eye, and hypertensive retinopathy without diabetic retinopathy.    PREVENTIVE CARE:  He received all recommended vaccinations including COVID vaccine, RSV vaccine, flu vaccine, and pneumonia vaccine at [facility]. Colonoscopy in 2024 was normal with no need for repeat procedure. Diabetic eye screening in June was negative for any findings.    UROLOGY:  He follows with urology for elevated PSA and reports good urine flow.    LABS:  He has not fasted this morning, having consumed coffee and a small [place] prior to scheduled labs.       Patient Outreach on 10/24/2024   Component Date Value Ref Range Status    CRC Recommendation External 10/22/2024 No repeat colonoscopy   Final   Patient Outreach on 08/21/2024   Component Date Value Ref Range Status    Left Eye DM Retinopathy 06/05/2024 Negative   Final    Right Eye DM Retinopathy 06/05/2024 Negative   Final   Office Visit on 08/21/2024   Component Date Value Ref Range Status    Hemoglobin A1C, POC 08/21/2024 5.8  % Final      Past Medical History:   Diagnosis Date    Back pain 2/17/2016    Hyperlipidemia     Hypertension     Myalgia 3/29/2017     Past Surgical History:   Procedure Laterality Date    colonosoopy  10/22/2024    No repeat due to age and absence of colonic polyps per Dr. Ana Muhammad    TONSILLECTOMY       Family History   Problem Relation Name Age of Onset  "   Cancer Mother      No Known Problems Father         Marital Status:   Alcohol History:  reports no history of alcohol use.  Tobacco History:  reports that he has never smoked. He has never used smokeless tobacco.  Drug History:  reports no history of drug use.    Review of patient's allergies indicates:   Allergen Reactions    Amoxicillin      Current Medications[1]    Review of Systems   Constitutional:  Negative for activity change and unexpected weight change.   HENT:  Negative for hearing loss, rhinorrhea and trouble swallowing.    Eyes:  Negative for discharge and visual disturbance.   Respiratory:  Negative for chest tightness and wheezing.    Cardiovascular:  Negative for chest pain and palpitations.   Gastrointestinal:  Negative for blood in stool, constipation, diarrhea and vomiting.   Endocrine: Negative for polydipsia and polyuria.   Genitourinary:  Negative for difficulty urinating, hematuria and urgency.   Musculoskeletal:  Negative for arthralgias, joint swelling and neck pain.   Neurological:  Negative for weakness and headaches.   Psychiatric/Behavioral:  Negative for confusion and dysphoric mood.           Objective:          8/21/2024     9:21 AM 2/19/2025     9:49 AM   Vitals - 1 value per visit   SYSTOLIC 110 112   DIASTOLIC 68 60   Pulse 90 91   Resp  18   SPO2 95 % 96 %   Weight (lb) 199 196   Weight (kg) 90.266 88.905   Height 5' 4" (1.626 m) 5' 4" (1.626 m)   BMI (Calculated) 34.1 33.6   Pain Score  Zero      Physical Exam  Constitutional:       Appearance: Normal appearance.   HENT:      Head: Normocephalic and atraumatic.      Mouth/Throat:      Mouth: Mucous membranes are moist.   Eyes:      Conjunctiva/sclera: Conjunctivae normal.   Cardiovascular:      Rate and Rhythm: Normal rate and regular rhythm.   Pulmonary:      Effort: Pulmonary effort is normal.   Neurological:      General: No focal deficit present.      Mental Status: He is alert and oriented to person, place, and " "time.   Psychiatric:         Mood and Affect: Mood normal.         Behavior: Behavior normal.             Assessment:       Assessment & Plan    IMPRESSION:  - Assessed hypertension and hyperlipidemia management, noting well-controlled blood pressure and successful weight loss  - Reviewed immunization status, confirming up-to-date COVID, RSV, and flu vaccines  - Evaluated pneumonia vaccine status, determining likely adequacy based on patient's recollection  - Noted recent negative colonoscopy (2024) and diabetic eye screening (June)  - Acknowledged resolved retinal hemorrhage issue  - Considered hypertensive retinopathy without diabetic retinopathy  - Assessed slightly elevated A1C (5.8), expecting improvement with continued weight loss  - Reviewed urology follow-up for elevated PSA, noting no changes in therapy    OBESITY, CLASS 2:  - Monitor patient's weight, which has decreased by 12 lbs over the past year.  - Acknowledge this success and re-evaluate obesity status.  - Measure A1C, currently slightly elevated at 5.8, potentially related to obesity.  - Continue weight management through lifestyle changes to improve BMI and A1C levels.    VITREOUS HEMORRHAGE, LEFT EYE:  - Patient's history of hemorrhage in the back of the eye is no longer an issue.  - Diabetic eye screening in June showed negative findings, but hypertensive retinopathy was diagnosed (not diabetic retinopathy).  - Schedule annual eye exams, with the next one in June.    HYPERTENSION:  - Continue amlodipine benazepril for hypertension.  - Monitor blood pressure, which is currently well-controlled and described as "beautiful".  - Acknowledge patient's good blood pressure control.  - Prescribe medication refills for August.    HYPERLIPIDEMIA:  - Continue pravastatin for hyperlipidemia.  - Monitor patient's adherence to medication.  - Prescribe refills for August.    HYPERTENSIVE RETINOPATHY:  - Schedule an eye exam for June.    DIVERTICULITIS:  - Note " diverticulitis in patient's medical history.    PREDIABETES:  - Monitor A1C, which is slightly elevated at 5.8, indicating prediabetes.    ELEVATED PSA:  - Patient is under urological care for elevated PSA.  - Monitor for any changes in therapy.  - Confirm patient reports good urine flow.    WEIGHT MANAGEMENT:  - Encourage patient to continue efforts to lose weight.            Plan:       Essential hypertension    Mixed hyperlipidemia    Hypertensive retinopathy of both eyes    Prediabetes    Morbid obesity    BPH with obstruction/lower urinary tract symptoms      Medication List with Changes/Refills   Current Medications    AMLODIPINE-BENAZEPRIL 5-10 MG (LOTREL) 5-10 MG PER CAPSULE    Take 1 capsule by mouth once daily.    PRAVASTATIN (PRAVACHOL) 80 MG TABLET    Take 1 tablet (80 mg total) by mouth once daily.    SEMAGLUTIDE SUBQ        TADALAFIL (CIALIS) 20 MG TAB    Take 20 mg by mouth every other day.      Follow up in about 1 year (around 2/19/2026) for Annual Physical, HTN.      Stable on medications continue current    This note was generated with the assistance of ambient listening technology. Verbal consent was obtained by the patient and accompanying visitor(s) for the recording of patient appointment to facilitate this note. I attest to having reviewed and edited the generated note for accuracy, though some syntax or spelling errors may persist. Please contact the author of this note for any clarification.               [1]   Current Outpatient Medications:     amlodipine-benazepril 5-10 mg (LOTREL) 5-10 mg per capsule, Take 1 capsule by mouth once daily., Disp: 90 capsule, Rfl: 3    pravastatin (PRAVACHOL) 80 MG tablet, Take 1 tablet (80 mg total) by mouth once daily., Disp: 90 tablet, Rfl: 3    SEMAGLUTIDE SUBQ, , Disp: , Rfl:     tadalafiL (CIALIS) 20 MG Tab, Take 20 mg by mouth every other day., Disp: , Rfl:

## 2025-07-03 ENCOUNTER — LAB VISIT (OUTPATIENT)
Dept: LAB | Facility: HOSPITAL | Age: 75
End: 2025-07-03
Attending: UROLOGY
Payer: MEDICARE

## 2025-07-03 DIAGNOSIS — R97.20 ELEVATED PROSTATE SPECIFIC ANTIGEN (PSA): Primary | ICD-10-CM

## 2025-07-03 DIAGNOSIS — N40.1 ENLARGED PROSTATE WITH URINARY OBSTRUCTION: ICD-10-CM

## 2025-07-03 DIAGNOSIS — N13.8 ENLARGED PROSTATE WITH URINARY OBSTRUCTION: ICD-10-CM

## 2025-07-03 LAB — PSA SERPL-MCNC: 5.31 NG/ML (ref ?–4)

## 2025-07-03 PROCEDURE — 84153 ASSAY OF PSA TOTAL: CPT

## 2025-07-03 PROCEDURE — 36415 COLL VENOUS BLD VENIPUNCTURE: CPT
